# Patient Record
Sex: FEMALE | Race: WHITE | NOT HISPANIC OR LATINO | Employment: FULL TIME | URBAN - METROPOLITAN AREA
[De-identification: names, ages, dates, MRNs, and addresses within clinical notes are randomized per-mention and may not be internally consistent; named-entity substitution may affect disease eponyms.]

---

## 2017-02-06 ENCOUNTER — OFFICE VISIT (OUTPATIENT)
Dept: INTERNAL MEDICINE | Facility: CLINIC | Age: 57
End: 2017-02-06

## 2017-02-06 VITALS
RESPIRATION RATE: 20 BRPM | SYSTOLIC BLOOD PRESSURE: 140 MMHG | DIASTOLIC BLOOD PRESSURE: 82 MMHG | WEIGHT: 139 LBS | TEMPERATURE: 98.5 F | BODY MASS INDEX: 25.02 KG/M2 | HEART RATE: 72 BPM

## 2017-02-06 DIAGNOSIS — M54.6 ACUTE RIGHT-SIDED THORACIC BACK PAIN: Primary | ICD-10-CM

## 2017-02-06 PROCEDURE — 99214 OFFICE O/P EST MOD 30 MIN: CPT | Performed by: INTERNAL MEDICINE

## 2017-02-06 RX ORDER — CYCLOBENZAPRINE HCL 10 MG
10 TABLET ORAL 3 TIMES DAILY PRN
Qty: 30 TABLET | Refills: 0 | Status: SHIPPED | OUTPATIENT
Start: 2017-02-06 | End: 2017-02-16

## 2017-02-06 NOTE — PROGRESS NOTES
Subjective       Анна Milner is a 57 y.o. female.     Chief Complaint   Patient presents with   • Back Pain     mid back x 1 day      The patient states she twisted a certain way and felt a spasm in her back.  It got a little better, but worse again this morning.  No previous back problems.    Back Pain   This is a new problem. The current episode started yesterday. The problem occurs constantly. The problem has been gradually worsening since onset. Pain location: bilateral mid back muscles. The quality of the pain is described as aching and shooting. The pain does not radiate. The pain is moderate (severe with spasms). The symptoms are aggravated by twisting. Pertinent negatives include no abdominal pain, bladder incontinence, bowel incontinence, chest pain, dysuria, fever, leg pain, numbness, paresis, paresthesias, pelvic pain, tingling or weakness. Risk factors: None. She has tried NSAIDs and heat for the symptoms. The treatment provided mild relief.        The following portions of the patient's history were reviewed and updated as appropriate: allergies, current medications, past family history, past medical history, past social history, past surgical history and problem list.      Review of Systems   Constitutional: Negative for chills and fever.   Respiratory: Negative for cough and shortness of breath.    Cardiovascular: Negative for chest pain.   Gastrointestinal: Negative for abdominal pain, blood in stool, bowel incontinence, constipation, diarrhea, nausea and vomiting.        Denies melena.   Genitourinary: Positive for vaginal bleeding (on menses). Negative for bladder incontinence, decreased urine volume, dysuria, frequency, hematuria, pelvic pain and vaginal discharge.   Musculoskeletal: Positive for back pain.   Skin: Negative for rash.   Neurological: Negative for tingling, weakness, numbness and paresthesias.         Blood pressure 140/82, pulse 72, temperature 98.5 °F (36.9 °C), temperature  source Temporal Artery , resp. rate 20, weight 139 lb (63 kg), not currently breastfeeding.      Objective     Physical Exam   Constitutional: She appears well-developed and well-nourished.   Neck: Normal range of motion. Neck supple.   There is no tenderness to palpation of the cervical spine or paraspinal muscles.   Cardiovascular: Normal rate, regular rhythm and normal heart sounds.    No murmur heard.  Pulmonary/Chest: Effort normal and breath sounds normal.   Abdominal: Soft. Bowel sounds are normal. She exhibits no distension and no mass. There is no hepatomegaly. There is no tenderness.   No CVA tenderness.   Musculoskeletal: Normal range of motion.   There is no tenderness to palpation over the lumbar or  thoracic spine.  There is tenderness to palpation over the right thoracic paraspinal muscles. There is no pain to palpation over the lumbar or left thoracic paraspinal muscles.  Straight leg raise is positive bilaterally, causing pain on the right middle back.  There is pain with right hip abduction.  There is no pain with right hip flexion, extension,  and adduction. There is no pain with left hip flexion, extension, abduction, and adduction.  There is no pain with left hip flexion, extension, abduction, and adduction.   Neurological: She has normal strength. She displays abnormal reflex (3+ and equal throughout).   Skin: No rash noted.   Psychiatric: She has a normal mood and affect.   Nursing note and vitals reviewed.        Assessment/Plan   Анна was seen today for back pain.    Diagnoses and all orders for this visit:    Acute right-sided thoracic back pain  -     diclofenac (VOLTAREN) 50 MG EC tablet; Take 1 tablet by mouth 2 (Two) Times a Day As Needed (pain) for up to 14 days.  -     cyclobenzaprine (FLEXERIL) 10 MG tablet; Take 1 tablet by mouth 3 (Three) Times a Day As Needed for muscle spasms for up to 10 days.          Continue heat.    Return if symptoms worsen or fail to improve.

## 2017-02-06 NOTE — PATIENT INSTRUCTIONS
Continue heat.          Back Pain, Adult  Back pain is very common in adults. The cause of back pain is rarely dangerous and the pain often gets better over time. The cause of your back pain may not be known. Some common causes of back pain include:  · Strain of the muscles or ligaments supporting the spine.  · Wear and tear (degeneration) of the spinal disks.  · Arthritis.  · Direct injury to the back.  For many people, back pain may return. Since back pain is rarely dangerous, most people can learn to manage this condition on their own.  HOME CARE INSTRUCTIONS  Watch your back pain for any changes. The following actions may help to lessen any discomfort you are feeling:  · Remain active. It is stressful on your back to sit or  one place for long periods of time. Do not sit, drive, or  one place for more than 30 minutes at a time. Take short walks on even surfaces as soon as you are able. Try to increase the length of time you walk each day.  · Exercise regularly as directed by your health care provider. Exercise helps your back heal faster. It also helps avoid future injury by keeping your muscles strong and flexible.  · Do not stay in bed. Resting more than 1-2 days can delay your recovery.  · Pay attention to your body when you bend and lift. The most comfortable positions are those that put less stress on your recovering back. Always use proper lifting techniques, including:    Bending your knees.    Keeping the load close to your body.    Avoiding twisting.  · Find a comfortable position to sleep. Use a firm mattress and lie on your side with your knees slightly bent. If you lie on your back, put a pillow under your knees.  · Avoid feeling anxious or stressed. Stress increases muscle tension and can worsen back pain. It is important to recognize when you are anxious or stressed and learn ways to manage it, such as with exercise.  · Take medicines only as directed by your health care provider.  Over-the-counter medicines to reduce pain and inflammation are often the most helpful. Your health care provider may prescribe muscle relaxant drugs. These medicines help dull your pain so you can more quickly return to your normal activities and healthy exercise.  · Apply ice to the injured area:    Put ice in a plastic bag.    Place a towel between your skin and the bag.    Leave the ice on for 20 minutes, 2-3 times a day for the first 2-3 days. After that, ice and heat may be alternated to reduce pain and spasms.  · Maintain a healthy weight. Excess weight puts extra stress on your back and makes it difficult to maintain good posture.  SEEK MEDICAL CARE IF:  · You have pain that is not relieved with rest or medicine.  · You have increasing pain going down into the legs or buttocks.  · You have pain that does not improve in one week.  · You have night pain.  · You lose weight.  · You have a fever or chills.  SEEK IMMEDIATE MEDICAL CARE IF:   · You develop new bowel or bladder control problems.  · You have unusual weakness or numbness in your arms or legs.  · You develop nausea or vomiting.  · You develop abdominal pain.  · You feel faint.     This information is not intended to replace advice given to you by your health care provider. Make sure you discuss any questions you have with your health care provider.     Document Released: 12/18/2006 Document Revised: 01/08/2016 Document Reviewed: 04/21/2015  [employer] Interactive Patient Education ©2016 [employer]

## 2017-04-13 ENCOUNTER — OFFICE VISIT (OUTPATIENT)
Dept: INTERNAL MEDICINE | Facility: CLINIC | Age: 57
End: 2017-04-13

## 2017-04-13 ENCOUNTER — HOSPITAL ENCOUNTER (OUTPATIENT)
Dept: GENERAL RADIOLOGY | Facility: HOSPITAL | Age: 57
Discharge: HOME | End: 2017-04-13
Attending: INTERNAL MEDICINE | Admitting: INTERNAL MEDICINE

## 2017-04-13 VITALS
WEIGHT: 134 LBS | BODY MASS INDEX: 24.12 KG/M2 | SYSTOLIC BLOOD PRESSURE: 118 MMHG | HEART RATE: 76 BPM | TEMPERATURE: 98.5 F | RESPIRATION RATE: 16 BRPM | DIASTOLIC BLOOD PRESSURE: 86 MMHG

## 2017-04-13 DIAGNOSIS — S92.354A CLOSED NONDISPLACED FRACTURE OF FIFTH METATARSAL BONE OF RIGHT FOOT, INITIAL ENCOUNTER: ICD-10-CM

## 2017-04-13 DIAGNOSIS — M79.671 RIGHT FOOT PAIN: ICD-10-CM

## 2017-04-13 DIAGNOSIS — M79.671 RIGHT FOOT PAIN: Primary | ICD-10-CM

## 2017-04-13 PROCEDURE — 73630 X-RAY EXAM OF FOOT: CPT

## 2017-04-13 PROCEDURE — 99214 OFFICE O/P EST MOD 30 MIN: CPT | Performed by: INTERNAL MEDICINE

## 2017-04-13 RX ORDER — HYDROCODONE BITARTRATE AND ACETAMINOPHEN 7.5; 325 MG/1; MG/1
1 TABLET ORAL EVERY 6 HOURS PRN
Qty: 12 TABLET | Refills: 0 | Status: SHIPPED | OUTPATIENT
Start: 2017-04-13 | End: 2017-11-01

## 2017-04-13 NOTE — PROGRESS NOTES
Chief Complaint   Patient presents with   • Fall   • Foot Injury     Right       History of Present Illness      The patient presents with pain in the right foot of a one day duration. There is a history of trauma. The trauma is described as a twisting injury. She fell down some steps. The trauma occurred yesterday.       The patient has swelling associated with the pain. There is no stiffness. The patient denies a history of overuse or repetitive motion with the affected joints.    The joint pain is aggravated by motion, walking, touching the affected area and manipulation of the affected area. The pain has no alleviating factors noted.     Medications      Current Outpatient Prescriptions:   •  diphenhydrAMINE (BENADRYL) 25 mg, Take 1 capsule by mouth At Night As Needed., Disp: , Rfl:      Allergies    Allergies   Allergen Reactions   • Sulfa Antibiotics Anaphylaxis   • Banana GI Intolerance   • Citrus GI Intolerance   • Dairy Aid [Lactase]    • Eggs Or Egg-Derived Products Diarrhea   • Pineapple GI Intolerance   • Wheat Bran    • Cephalosporins Rash       Problem List    Patient Active Problem List   Diagnosis   • Anxiety   • Oral thrush       Medications, Allergies, Problems List and Past History were reviewed and updated.    Physical Examination    /86 (BP Location: Left arm, Patient Position: Sitting)  Pulse 76  Temp 98.5 °F (36.9 °C) (Temporal Artery )   Resp 16  Wt 134 lb (60.8 kg)  BMI 24.12 kg/m2    Feet: The feet are symmetric with normal erickson landmarks. There is pain overlying the lateral aspect of the right foot. The feet have normal posterior tibial and dorsalis pedis pulses and normal capillary refill bilaterally. The arches are normal bilaterally. There are no skin or nail lesions present. There are no ingrown nails.    Radiology    My personal interpretation of the x-rays is as stated below:    The X-Ray of the right foot reveals a fracture. A fracture is seen in the DIP joint and  metatarsal of the fifth digit of the right foot. The fracture is non-displaced and obliqued. The bone fragment seen is not within the joint space.    Impression and Assessment    Fracture of the DIP Joint and Fracture of the Metatarsal of the fifth digit of the right foot ; the fracture is non-displaced and is obliqued.    Foot Pain.    Plan    Foot Pain Plan: Medication will be added as noted.    Fracture of the DIP Joint and Fracture of the Metatarsal Plan: She was referred to orthopedics.    Анна was seen today for fall and foot injury.    Diagnoses and all orders for this visit:    Right foot pain  -     XR Foot 3+ View Right; Future  -     Ambulatory Referral to Orthopedic Surgery  -     HYDROcodone-acetaminophen (NORCO) 7.5-325 MG per tablet; Take 1 tablet by mouth Every 6 (Six) Hours As Needed for Moderate Pain (4-6).    Closed nondisplaced fracture of fifth metatarsal bone of right foot, initial encounter  -     Ambulatory Referral to Orthopedic Surgery  -     HYDROcodone-acetaminophen (NORCO) 7.5-325 MG per tablet; Take 1 tablet by mouth Every 6 (Six) Hours As Needed for Moderate Pain (4-6).        Return to Office    The patient was instructed to return for follow-up at the next scheduled visit.    The patient was instructed to return sooner if the condition changes, worsens, or doesn't resolve.

## 2017-11-01 ENCOUNTER — OFFICE VISIT (OUTPATIENT)
Dept: INTERNAL MEDICINE | Facility: CLINIC | Age: 57
End: 2017-11-01

## 2017-11-01 VITALS
SYSTOLIC BLOOD PRESSURE: 118 MMHG | HEIGHT: 62 IN | DIASTOLIC BLOOD PRESSURE: 80 MMHG | RESPIRATION RATE: 14 BRPM | WEIGHT: 147 LBS | TEMPERATURE: 98.4 F | HEART RATE: 72 BPM | BODY MASS INDEX: 27.05 KG/M2

## 2017-11-01 DIAGNOSIS — Z00.00 PHYSICAL EXAM: Primary | ICD-10-CM

## 2017-11-01 DIAGNOSIS — Z23 IMMUNIZATION DUE: ICD-10-CM

## 2017-11-01 LAB
ARTICHOKE IGE QN: 126 MG/DL (ref 0–130)
BILIRUB BLD-MCNC: NEGATIVE MG/DL
CHOLEST SERPL-MCNC: 206 MG/DL (ref 0–200)
CLARITY, POC: CLEAR
COLOR UR: YELLOW
EXPIRATION DATE: NORMAL
GLUCOSE UR STRIP-MCNC: NEGATIVE MG/DL
HDLC SERPL-MCNC: 84 MG/DL (ref 40–60)
KETONES UR QL: NEGATIVE
LEUKOCYTE EST, POC: NEGATIVE
Lab: NORMAL
NITRITE UR-MCNC: NEGATIVE MG/ML
PH UR: 6 [PH] (ref 5–8)
PROT UR STRIP-MCNC: NEGATIVE MG/DL
RBC # UR STRIP: NEGATIVE /UL
SP GR UR: 1.01 (ref 1–1.03)
TRIGL SERPL-MCNC: 119 MG/DL (ref 0–150)
UROBILINOGEN UR QL: NORMAL

## 2017-11-01 PROCEDURE — 81003 URINALYSIS AUTO W/O SCOPE: CPT | Performed by: INTERNAL MEDICINE

## 2017-11-01 PROCEDURE — 99396 PREV VISIT EST AGE 40-64: CPT | Performed by: INTERNAL MEDICINE

## 2017-11-01 PROCEDURE — 36415 COLL VENOUS BLD VENIPUNCTURE: CPT | Performed by: INTERNAL MEDICINE

## 2017-11-01 PROCEDURE — 80061 LIPID PANEL: CPT | Performed by: INTERNAL MEDICINE

## 2017-11-01 PROCEDURE — 90471 IMMUNIZATION ADMIN: CPT | Performed by: INTERNAL MEDICINE

## 2017-11-01 PROCEDURE — 90715 TDAP VACCINE 7 YRS/> IM: CPT | Performed by: INTERNAL MEDICINE

## 2017-11-01 NOTE — PROGRESS NOTES
Chief Complaint   Patient presents with   • Annual Exam       History of Present Illness      The patient presents for an established patient physical examination and health maintenance visit. The patient has had a colonoscopy.    She received a mammogram last month at .  She goes to  for gynecologic examinations.    Review of Systems    GENERAL- Denies Unexplained Weight Loss, Fever, Chills, Sweats, Fatigue, Weakness or Malaise.    HEENT- Denies Eye Pain, Eye Drainage, Nasal Discharge, Sore Throat, Ear Pain, Ear Drainage, Headache, Alopecia, Decreased Vision, Sinus Pain, Nasal Congestion, Decreased Hearing, Visual Disturbance, Diplopia or Tinnitus.    NECK- Denies Decreased Range of Motion, Stiffness, Thyroid Nodules, Enlarged Lymph Nodes or Goiter.    CARDIOVASCULAR- Denies Chest Pain, Claudication, Edema, Syncope or Palpitations.    PULMONARY- Denies Wheezing, Dyspnea, Sputum Production, Cough, Hemoptysis or Pleuritic Chest Pain.    GASTROINTESTINAL- Denies Abdominal Pain, Nausea, Vomiting, Diarrhea, Blood per Rectum, Constipation or Heartburn.    GENITOURINARY- Denies Dysuria, Hematuria, Urinary Frequency, Urinary Leakage, Pelvic Pain, Vaginal Prolapse, Vaginal Discharge, Dyspareunia or Abnormal Menses.    ENDOCRINE- Denies Cold Intolerance, Depression, Heat Intolerance, Memory Loss, Polydypsia, Polyphagia, Polyuria, Sleep Disturbance or Weight Gain.    NEUROLOGIC- Denies Seizures, Visual Changes, Confusion or Excessive Daytime Sleepiness.    MUSCULOSKELETAL- Denies Joint Pain, Joint Stiffness, Decreased Range of Motion, Joint Swelling or Erythema of Joints.    INTEGUMENTARY- Denies Rash, Lumps, Sores, Itching, Dryness, Color Change, Changes in Hair, Brittle Nails, Discolored Nails, Thickened Nails or Growths.    Medications      Current Outpatient Prescriptions:   •  diphenhydrAMINE (BENADRYL) 25 mg, Take 1 capsule by mouth At Night As Needed., Disp: , Rfl:      Allergies    Allergies   Allergen Reactions  "  • Sulfa Antibiotics Anaphylaxis   • Banana GI Intolerance   • Blueberry [Vaccinium Angustifolium] Other (See Comments)     PER ALLERGY TESTING   • Citrus GI Intolerance   • Dairy Aid [Lactase]    • Eggs Or Egg-Derived Products Diarrhea   • Pineapple GI Intolerance   • Wheat Bran    • Cephalosporins Rash       Problem List    Patient Active Problem List   Diagnosis   • Anxiety   • Oral thrush       Medications, Allergies, Problems List and Past History were reviewed and updated.    Physical Examination    /80 (BP Location: Left arm, Patient Position: Sitting, Cuff Size: Adult)  Pulse 72  Temp 98.4 °F (36.9 °C) (Temporal Artery )   Resp 14  Ht 62.25\" (158.1 cm)  Wt 147 lb (66.7 kg)  LMP 03/06/2017 (Approximate) Comment: LAST PAP 11/2016 DR AMAYA  Breastfeeding? No  BMI 26.67 kg/m2    HEENT: Head- Normocephalic Atraumatic. Facies- Within normal limits. Pinnas- Normal texture and shape bilaterally. Canals- Normal bilaterally. TMs- Normal bilaterally. Nares- Patent bilaterally. Nasal Septum- is normal. There is no tenderness to palpation over the frontal or maxillary sinuses. Lids- Normal bilaterally. Conjunctiva- Clear bilaterally. Sclera- Anicteric bilaterally. Oropharynx- Moist with no lesions. Tonsils- No enlargement, erythema or exudate.    Neck: Thyroid- non enlarged, symmetric and has no nodules. No bruits are detected. ROM- Normal Range of Motion with no rigidity.    Lymph Nodes: Cervical- no enlarged lymph nodes noted. Clavicular- Deferred. Axillary- Deferred. Inguinal- Deferred.    Lungs: Auscultation- Clear to auscultation bilaterally. There are no retractions, clubbing or cyanosis. The Expiratory to Inspiratory ratio is equal.    Cardiovascular: There are no carotid bruits. Heart- Normal Rate with Regular rhythm and no murmurs. There are no gallops. There are no rubs. In the lower extremities there is no edema. The upper extremities do not have edema.    Abdomen: Soft, benign, non-tender with " no masses, hernias, organomegaly or scars.    Breast: Normal contours bilaterally with no masses, discharge, skin changes or lumps. There are no scars noted.    The patient has no worrisome or suspicious skin lesions noted.    Impression and Assessment    Normal Physical Examination.    Plan    Counseling was provided regarding: Adequate Aerobic Exercise, Dental Visits, Flossing Teeth, Handwashing, Heart Healthy Diet and Seat Belt Utilization.    The following was ordered for screening and health maintenance: Lipid Profile and U/A.    Counseled regarding immunizations and applicable VIS given.    Immunizations Ordered and Administered: Tdap.    Анна was seen today for annual exam.    Diagnoses and all orders for this visit:    Physical exam  -     POC Urinalysis Dipstick, Automated  -     Lipid Panel  -     Tdap Vaccine Greater Than or Equal To 8yo IM    Immunization due  -     Tdap Vaccine Greater Than or Equal To 8yo IM        Return to Office    The patient was instructed to return for follow-up in 1 year. Next Visit: Physical.    The patient was instructed to return sooner if the condition changes, worsens, or doesn't resolve.

## 2018-07-02 ENCOUNTER — OFFICE VISIT (OUTPATIENT)
Dept: INTERNAL MEDICINE | Facility: CLINIC | Age: 58
End: 2018-07-02

## 2018-07-02 VITALS
TEMPERATURE: 98.2 F | SYSTOLIC BLOOD PRESSURE: 118 MMHG | BODY MASS INDEX: 25.98 KG/M2 | HEART RATE: 92 BPM | WEIGHT: 143.2 LBS | RESPIRATION RATE: 16 BRPM | DIASTOLIC BLOOD PRESSURE: 70 MMHG

## 2018-07-02 DIAGNOSIS — M25.512 ACUTE PAIN OF LEFT SHOULDER: ICD-10-CM

## 2018-07-02 DIAGNOSIS — M54.2 NECK PAIN: Primary | ICD-10-CM

## 2018-07-02 PROCEDURE — 99214 OFFICE O/P EST MOD 30 MIN: CPT | Performed by: NURSE PRACTITIONER

## 2018-07-02 RX ORDER — TIZANIDINE 2 MG/1
2 TABLET ORAL NIGHTLY PRN
Qty: 30 TABLET | Refills: 0 | Status: SHIPPED | OUTPATIENT
Start: 2018-07-02 | End: 2018-08-13
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
## 2018-07-03 ENCOUNTER — HOSPITAL ENCOUNTER (OUTPATIENT)
Dept: GENERAL RADIOLOGY | Facility: HOSPITAL | Age: 58
Discharge: HOME | End: 2018-07-03
Admitting: NURSE PRACTITIONER

## 2018-07-03 DIAGNOSIS — M54.2 NECK PAIN: ICD-10-CM

## 2018-07-03 PROCEDURE — 73030 X-RAY EXAM OF SHOULDER: CPT

## 2018-07-03 PROCEDURE — 72040 X-RAY EXAM NECK SPINE 2-3 VW: CPT

## 2018-07-09 ENCOUNTER — TELEPHONE (OUTPATIENT)
Dept: INTERNAL MEDICINE | Facility: CLINIC | Age: 58
End: 2018-07-09

## 2018-07-09 NOTE — TELEPHONE ENCOUNTER
----- Message from Catalina Schuster sent at 7/9/2018 11:32 AM EDT -----  Contact: SELF  REEMA SRIVASTAVA WAS SENT UP FOR A 1 MONTH APPT ON 8/7/18 WITH JAHAIRA BUT SAID IT WAS SUPPOSED TO BE WITH DR YOUNG. SHE WANTS TO KNOW IF YOU COULD FIT HER IN AROUND THAT SAME DAY. REEMA CAN BE REACHED -[phone]

## 2018-07-10 NOTE — TELEPHONE ENCOUNTER
It will need to be in a regularly scheduled slot as I can't stay that late.  Sorry.  Oseas Mendenhall MD  1:32 PM  07/10/18

## 2018-07-19 ENCOUNTER — HOSPITAL ENCOUNTER (OUTPATIENT)
Dept: PHYSICAL THERAPY | Facility: HOSPITAL | Age: 58
Setting detail: THERAPIES SERIES
Discharge: HOME | End: 2018-07-19

## 2018-07-19 DIAGNOSIS — M54.2 NECK PAIN: Primary | ICD-10-CM

## 2018-07-19 PROCEDURE — 97161 PT EVAL LOW COMPLEX 20 MIN: CPT | Performed by: PHYSICAL THERAPIST
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
## 2018-07-24 ENCOUNTER — HOSPITAL ENCOUNTER (OUTPATIENT)
Dept: PHYSICAL THERAPY | Facility: HOSPITAL | Age: 58
Setting detail: THERAPIES SERIES
Discharge: HOME | End: 2018-07-24

## 2018-07-24 DIAGNOSIS — M54.2 NECK PAIN: Primary | ICD-10-CM

## 2018-07-24 PROCEDURE — 97110 THERAPEUTIC EXERCISES: CPT | Performed by: PHYSICAL THERAPIST

## 2018-07-24 PROCEDURE — 97140 MANUAL THERAPY 1/> REGIONS: CPT | Performed by: PHYSICAL THERAPIST

## 2018-07-24 NOTE — THERAPY TREATMENT NOTE
Outpatient Physical Therapy Ortho Treatment Note  [facility]     Patient Name: Анна Turpin  : 1960  MRN: [id number]  Today's Date: 2018      Visit Date: 2018    Visit Dx:    ICD-10-CM ICD-9-CM   1. Neck pain M54.2 723.1       Patient Active Problem List   Diagnosis   • Anxiety   • Oral thrush        Past Medical History:   Diagnosis Date   • Allergic    • Asthma         Past Surgical History:   Procedure Laterality Date   • EYE SURGERY     • TONSILLECTOMY                               PT Assessment/Plan     Row Name 18 1000          PT Assessment    Assessment Comments Patient tolerates treatment well and she is demonstrating spontaneous improvement in her overall reactivity.  -PIERCE        PT Plan    PT Plan Comments add scapular exercises: mid/low rows and no money.  -PIERCE       User Key  (r) = Recorded By, (t) = Taken By, (c) = Cosigned By    Initials Name Provider Type    PIERCE Madrid, PT Physical Therapist                    Exercises     Row Name 18 0900 18 0800          Subjective Comments    Subjective Comments  -- Patient states that she has began to feel better, although she hasn't been very compliant with her exercises.  -PIERCE        Subjective Pain    Able to rate subjective pain?  -- yes  -PIERCE     Pre-Treatment Pain Level  -- 2  -PIERCE     Post-Treatment Pain Level  -- 2  -PIERCE        Total Minutes    31803 - PT Therapeutic Exercise Minutes  -- 17  -PIERCE     07422 - PT Manual Therapy Minutes 19  -PIERCE  --        Exercise 2    Exercise Name 2  -- supine chin tuck  -PIERCE     Sets 2  -- 2  -PIERCE     Reps 2  -- 10  -PIERCE     Time 2  -- 3 seconds  -PIERCE        Exercise 3    Exercise Name 3  -- seated upper trap stretch  -PIERCE     Sets 3  -- 3  -PIERCE     Time 3  -- 30 seconds  -PIERCE        Exercise 4    Exercise Name 4  -- scap squeezes  -PIERCE     Reps 4  -- 15  -PIERCE        Exercise 5    Exercise Name 5  -- door stretch arms wide  -PIERCE     Sets 5  -- 3  -PIERCE     Time 5  -- 30 seconds  -PIERCE        User Key  (r) = Recorded By, (t) = Taken By, (c) = Cosigned By    Initials Name Provider Type    PIERCE Madrid PT Physical Therapist                        Manual Rx (last 36 hours)      Manual Treatments     Row Name 07/24/18 0900             Total Minutes    02618 - PT Manual Therapy Minutes 19  -PIERCE         Manual Rx 1    Manual Rx 1 Location cervical spine  -PIERCE      Manual Rx 1 Type suboccipital distraction, unilateral PAs, SGL's, manual trap stretching  -PIERCE        User Key  (r) = Recorded By, (t) = Taken By, (c) = Cosigned By    Initials Name Provider Type    PIERCE Madrid PT Physical Therapist              Therapy Education  Education Details: added upper trap stretch, supine chin tuck, and door stretch arms wide  Given: HEP  Program: New  How Provided: Verbal, Demonstration, Written  Provided to: Patient  Level of Understanding: Verbalized, Demonstrated              Time Calculation:   Start Time: 0734  Therapy Suggested Charges     Code   Minutes Charges    57903 (CPT®) Hc Pt Neuromusc Re Education Ea 15 Min      77615 (CPT®) Hc Pt Ther Proc Ea 15 Min 17 1    29800 (CPT®) Hc Gait Training Ea 15 Min      90496 (CPT®) Hc Pt Therapeutic Act Ea 15 Min      39491 (CPT®) Hc Pt Manual Therapy Ea 15 Min 19 1    24057 (CPT®) Hc Pt Ther Massage- Per 15 Min      03960 (CPT®) Hc Pt Iontophoresis Ea 15 Min      28556 (CPT®) Hc Pt Elec Stim Ea-Per 15 Min      92745 (CPT®) Hc Pt Ultrasound Ea 15 Min      42232 (CPT®) Hc Pt Self Care/Mgmt/Train Ea 15 Min      Total  36 2        Therapy Charges for Today     Code Description Service Date Service Provider Modifiers Qty    51713095990 HC PT THER PROC EA 15 MIN 7/24/2018 Michael Madrid, PT GP 1    55718956588 HC PT MANUAL THERAPY EA 15 MIN 7/24/2018 Michael Madrid, PT GP 1                    Michael Madrid, PT  7/24/2018

## 2018-07-26 ENCOUNTER — HOSPITAL ENCOUNTER (OUTPATIENT)
Dept: PHYSICAL THERAPY | Facility: HOSPITAL | Age: 58
Setting detail: THERAPIES SERIES
Discharge: HOME | End: 2018-07-26

## 2018-07-26 DIAGNOSIS — M54.2 NECK PAIN: Primary | ICD-10-CM

## 2018-07-26 PROCEDURE — 97140 MANUAL THERAPY 1/> REGIONS: CPT | Performed by: PHYSICAL THERAPIST

## 2018-07-26 PROCEDURE — 97110 THERAPEUTIC EXERCISES: CPT | Performed by: PHYSICAL THERAPIST

## 2018-07-26 NOTE — THERAPY TREATMENT NOTE
"    Outpatient Physical Therapy Ortho Treatment Note  [facility]     Patient Name: Анна Turpin  : 1960  MRN: [id number]  Today's Date: 2018      Visit Date: 2018    Visit Dx:    ICD-10-CM ICD-9-CM   1. Neck pain M54.2 723.1       Patient Active Problem List   Diagnosis   • Anxiety   • Oral thrush        Past Medical History:   Diagnosis Date   • Allergic    • Asthma         Past Surgical History:   Procedure Laterality Date   • EYE SURGERY     • TONSILLECTOMY                               PT Assessment/Plan     Row Name 18 1100          PT Assessment    Assessment Comments Patient reports continued tension in the upper trap and scapular region.  She tolerates treatment well overall.  DN was discussed but patient elected to try other treatment methods.  -PIERCE        PT Plan    PT Plan Comments add thoracic mobility  -PIERCE       User Key  (r) = Recorded By, (t) = Taken By, (c) = Cosigned By    Initials Name Provider Type    PIERCE Madrid, PT Physical Therapist                    Exercises     Row Name 18 0800             Subjective Comments    Subjective Comments Patient states that she remains \"tight\" in regards to her right scapular region and upper trap region.  She reports that she is moving this weekend and plans to do a lot of lifting.  She has been compliant with her HEP.  -PIERCE         Subjective Pain    Able to rate subjective pain? yes  -PIERCE      Pre-Treatment Pain Level 2  -PIERCE         Total Minutes    38376 - PT Therapeutic Exercise Minutes 24  -PIERCE      63602 - PT Manual Therapy Minutes 15  -PIERCE         Exercise 2    Exercise Name 2 supine chin tuck  -PIERCE      Sets 2 2  -PIERCE      Reps 2 10  -PIERCE      Time 2 3 seconds  -PIERCE         Exercise 3    Exercise Name 3 seated upper trap/levator stretch  -PIERCE      Sets 3 3  -PIERCE      Time 3 30 seconds  -PIERCE         Exercise 4    Exercise Name 4 scap squeezes  -PIERCE      Reps 4 15  -PIERCE         Exercise 5    Exercise Name 5 door stretch arms wide  " -PIERCE      Sets 5 3  -PIERCE      Time 5 30 seconds  -PIERCE         Exercise 6    Exercise Name 6 levator stretch  -PIERCE      Sets 6 3  -PIERCE      Time 6 30 seconds  -PIERCE         Exercise 7    Exercise Name 7 middle rows green band  -PIERCE      Reps 7 15  -PIERCE         Exercise 8    Exercise Name 8 low rows green band  -PIERCE      Reps 8 15  -PIERCE         Exercise 9    Exercise Name 9 no money green band  -PIERCE      Reps 9 15  -PIERCE         Exercise 10    Exercise Name 10 elephant stretch  -PIERCE      Sets 10 2  -PIERCE      Time 10 30 seconds  -PIERCE        User Key  (r) = Recorded By, (t) = Taken By, (c) = Cosigned By    Initials Name Provider Type    PIERCE Madrid, PT Physical Therapist                        Manual Rx (last 36 hours)      Manual Treatments     Row Name 07/26/18 0800             Total Minutes    71156 - PT Manual Therapy Minutes 15  -PIERCE         Manual Rx 1    Manual Rx 1 Location cervical spine  -PIERCE      Manual Rx 1 Type suboccipital distraction, unilateral PAs, SGL's, manual trap stretching  -PIERCE         Manual Rx 2    Manual Rx 2 Location scapula  -PIERCE      Manual Rx 2 Type sidelying winging mobilization  -PIERCE        User Key  (r) = Recorded By, (t) = Taken By, (c) = Cosigned By    Initials Name Provider Type    PIERCE Madrid, PT Physical Therapist              Therapy Education  Education Details: added band exercises and levator stretch, held scap squeezes  Given: HEP  Program: New  How Provided: Verbal, Demonstration, Written  Provided to: Patient  Level of Understanding: Verbalized, Demonstrated              Time Calculation:   Start Time: 0730  Therapy Suggested Charges     Code   Minutes Charges    61657 (CPT®) Hc Pt Neuromusc Re Education Ea 15 Min      67142 (CPT®) Hc Pt Ther Proc Ea 15 Min 24 2    50186 (CPT®) Hc Gait Training Ea 15 Min      79686 (CPT®) Hc Pt Therapeutic Act Ea 15 Min      02087 (CPT®) Hc Pt Manual Therapy Ea 15 Min 15 1    25426 (CPT®) Hc Pt Ther Massage- Per 15 Min      11946 (CPT®) Hc Pt  Iontophoresis Ea 15 Min      44385 (CPT®) Hc Pt Elec Stim Ea-Per 15 Min      34383 (CPT®) Hc Pt Ultrasound Ea 15 Min      33822 (CPT®) Hc Pt Self Care/Mgmt/Train Ea 15 Min      Total  39 3        Therapy Charges for Today     Code Description Service Date Service Provider Modifiers Qty    47882336079 HC PT THER PROC EA 15 MIN 7/26/2018 Michael Madrid, PT GP 2    05695687723 HC PT MANUAL THERAPY EA 15 MIN 7/26/2018 Michael Madrid, PT GP 1                    Michael Madrid, PT  7/26/2018

## 2018-07-31 ENCOUNTER — HOSPITAL ENCOUNTER (OUTPATIENT)
Dept: PHYSICAL THERAPY | Facility: HOSPITAL | Age: 58
Setting detail: THERAPIES SERIES
Discharge: HOME | End: 2018-07-31

## 2018-07-31 DIAGNOSIS — M54.2 NECK PAIN: Primary | ICD-10-CM

## 2018-07-31 PROCEDURE — 97035 APP MDLTY 1+ULTRASOUND EA 15: CPT | Performed by: PHYSICAL THERAPIST

## 2018-07-31 PROCEDURE — 97110 THERAPEUTIC EXERCISES: CPT | Performed by: PHYSICAL THERAPIST

## 2018-08-02 ENCOUNTER — HOSPITAL ENCOUNTER (OUTPATIENT)
Dept: PHYSICAL THERAPY | Facility: HOSPITAL | Age: 58
Setting detail: THERAPIES SERIES
Discharge: HOME | End: 2018-08-02

## 2018-08-02 DIAGNOSIS — M54.2 NECK PAIN: Primary | ICD-10-CM

## 2018-08-02 PROCEDURE — 97110 THERAPEUTIC EXERCISES: CPT | Performed by: PHYSICAL THERAPIST

## 2018-08-02 PROCEDURE — 97035 APP MDLTY 1+ULTRASOUND EA 15: CPT | Performed by: PHYSICAL THERAPIST

## 2018-08-02 NOTE — THERAPY TREATMENT NOTE
Outpatient Physical Therapy Ortho Treatment Note  [facility]     Patient Name: Анна Turpin  : 1960  MRN: [id number]  Today's Date: 2018      Visit Date: 2018    Visit Dx:    ICD-10-CM ICD-9-CM   1. Neck pain M54.2 723.1       Patient Active Problem List   Diagnosis   • Anxiety   • Oral thrush        Past Medical History:   Diagnosis Date   • Allergic    • Asthma         Past Surgical History:   Procedure Laterality Date   • EYE SURGERY     • TONSILLECTOMY                               PT Assessment/Plan     Row Name 18 0800          PT Assessment    Assessment Comments Pt verbalized compliance with HEP, decrease pain after manual techniques along scapular border.   -CP        PT Plan    PT Plan Comments Assess response from manual, progress as tolerated in clinic. Could increase resistance next visit.   -CP       User Key  (r) = Recorded By, (t) = Taken By, (c) = Cosigned By    Initials Name Provider Type    CP Perkins, Corinne E, PT Physical Therapist                Modalities     Row Name 18 08             Ultrasound 33641    Location right medial scapular border, UT stretch  -CP      Duty Cycle 100  -CP      Frequency 1.0 MHz  -CP      Intensity - Wts/cm 1.2  -CP      93176 - PT Ultrasound Minutes 8  -CP        User Key  (r) = Recorded By, (t) = Taken By, (c) = Cosigned By    Initials Name Provider Type    CP Perkins, Corinne E, PT Physical Therapist                Exercises     Row Name 18 0800             Subjective Comments    Subjective Comments Pt states her neck pain has been better seen last visit. She reports mild headache yesterday, but decreased overall.   -CP         Subjective Pain    Able to rate subjective pain? yes  -CP      Pre-Treatment Pain Level 1  -CP      Post-Treatment Pain Level 0  -CP         Exercise 1    Exercise Name 1 UBE SciFit Level 1.5  -CP      Time 1 2 min forward, 2 min backward; 4 min total  -CP         Exercise 2    Exercise Name 2  Quadruped chin tucks  -CP      Reps 2 12x  -CP         Exercise 3    Exercise Name 3 seated upper trap/levator stretch  -CP      Sets 3 3  -CP      Time 3 30 seconds  -CP         Exercise 4    Exercise Name 4 quadruped cervical rotation with thoracic rotation  -CP      Reps 4 8x each  -CP         Exercise 5    Exercise Name 5 door stretch arms wide  -CP      Sets 5 3  -CP      Time 5 30 seconds  -CP         Exercise 6    Exercise Name 6 levator stretch  -CP      Sets 6 3  -CP      Time 6 30 seconds  -CP         Exercise 7    Exercise Name 7 middle rows BTB  -CP      Reps 7 15  -CP      Additional Comments GTB  -CP         Exercise 8    Exercise Name 8 low rows  -CP      Reps 8 15  -CP      Additional Comments GTB  -CP         Exercise 9    Exercise Name 9 sternal lifts  -CP      Reps 9 15  -CP      Additional Comments BTB  -CP         Exercise 10    Exercise Name 10 alternating UE reach quadruped position  -CP      Reps 10 5x each  -CP         Exercise 11    Exercise Name 11 Quadruped cat camel  -CP      Reps 11 8x  -CP         Exercise 12    Exercise Name 12 wall thoracic slides  -CP      Reps 12 10  -CP        User Key  (r) = Recorded By, (t) = Taken By, (c) = Cosigned By    Initials Name Provider Type    CP Perkins, Corinne E, PT Physical Therapist                        Manual Rx (last 36 hours)      Manual Treatments     Row Name 08/02/18 0800             Manual Rx 2    Manual Rx 2 Location scapula  -CP      Manual Rx 2 Type sidelying winging mobilization; STM along medial scapular border  -CP      Manual Rx 2 Duration 8  -CP        User Key  (r) = Recorded By, (t) = Taken By, (c) = Cosigned By    Initials Name Provider Type    CP Perkins, Corinne E, PT Physical Therapist                             Time Calculation:   Start Time: 0802  Total Timed Code Minutes- PT: 30 minute(s)  Therapy Suggested Charges     Code   Minutes Charges    18645 (CPT®) Hc Pt Neuromusc Re Education Ea 15 Min      17651 (CPT®) Hc Pt  Ther Proc Ea 15 Min      80597 (CPT®) Hc Gait Training Ea 15 Min      06670 (CPT®) Hc Pt Therapeutic Act Ea 15 Min      64164 (CPT®) Hc Pt Manual Therapy Ea 15 Min      63998 (CPT®) Hc Pt Ther Massage- Per 15 Min      96278 (CPT®) Hc Pt Iontophoresis Ea 15 Min      69290 (CPT®) Hc Pt Elec Stim Ea-Per 15 Min      94162 (CPT®) Hc Pt Ultrasound Ea 15 Min 8 1    20341 (CPT®) Hc Pt Self Care/Mgmt/Train Ea 15 Min      Total  8 1        Therapy Charges for Today     Code Description Service Date Service Provider Modifiers Qty    47762912496 HC PT ULTRASOUND EA 15 MIN 8/2/2018 Perkins, Corinne E, PT GP 1    50676155741 HC PT THER PROC EA 15 MIN 8/2/2018 Perkins, Corinne E, PT GP 2                    Corinne E. Perkins, PT  8/2/2018

## 2018-08-13 ENCOUNTER — OFFICE VISIT (OUTPATIENT)
Dept: INTERNAL MEDICINE | Facility: CLINIC | Age: 58
End: 2018-08-13

## 2018-08-13 VITALS
HEART RATE: 76 BPM | BODY MASS INDEX: 26.31 KG/M2 | TEMPERATURE: 98.1 F | DIASTOLIC BLOOD PRESSURE: 72 MMHG | RESPIRATION RATE: 16 BRPM | SYSTOLIC BLOOD PRESSURE: 114 MMHG | WEIGHT: 145 LBS

## 2018-08-13 DIAGNOSIS — S13.4XXD: Primary | ICD-10-CM

## 2018-08-13 DIAGNOSIS — S13.4XXD: ICD-10-CM

## 2018-08-13 PROCEDURE — 99214 OFFICE O/P EST MOD 30 MIN: CPT | Performed by: INTERNAL MEDICINE

## 2018-08-13 RX ORDER — CYCLOBENZAPRINE HCL 10 MG
10 TABLET ORAL NIGHTLY PRN
Qty: 14 TABLET | Refills: 0 | Status: SHIPPED | OUTPATIENT
Start: 2018-08-13 | End: 2018-08-13 | Stop reason: SDUPTHER

## 2018-08-13 RX ORDER — CYCLOBENZAPRINE HCL 10 MG
10 TABLET ORAL NIGHTLY PRN
Qty: 14 TABLET | Refills: 0 | Status: SHIPPED | OUTPATIENT
Start: 2018-08-13 | End: 2018-09-12
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
## 2018-08-16 ENCOUNTER — HOSPITAL ENCOUNTER (OUTPATIENT)
Dept: PHYSICAL THERAPY | Facility: HOSPITAL | Age: 58
Setting detail: THERAPIES SERIES
Discharge: HOME | End: 2018-08-16

## 2018-08-16 DIAGNOSIS — M54.2 NECK PAIN: Primary | ICD-10-CM

## 2018-08-16 PROCEDURE — 97032 APPL MODALITY 1+ESTIM EA 15: CPT | Performed by: PHYSICAL THERAPIST

## 2018-08-16 PROCEDURE — 97110 THERAPEUTIC EXERCISES: CPT | Performed by: PHYSICAL THERAPIST

## 2018-08-16 NOTE — THERAPY PROGRESS REPORT/RE-CERT
Outpatient Physical Therapy Ortho Re-Assessment   [place]     Patient Name: Анна Turpin  : 1960  MRN: [id number]  Today's Date: 2018      Visit Date: 2018    Patient Active Problem List   Diagnosis   • Anxiety   • Oral thrush        Past Medical History:   Diagnosis Date   • Allergic    • Asthma         Past Surgical History:   Procedure Laterality Date   • EYE SURGERY     • TONSILLECTOMY         Visit Dx:     ICD-10-CM ICD-9-CM   1. Neck pain M54.2 723.1                 PT Ortho     Row Name 18 0800       Subjective Comments    Subjective Comments Pt reports she f/u with PCP this week, encouraged to continue PT and was given a new muscle relaxer to assist with improved pain mgmt and decrease muscle tightness. She states she has been training for work out of town recently, noted increased shoulder blade pain but improved neck pain. States she has been unpacking/moving with increased stress.   -CP       Subjective Pain    Able to rate subjective pain? yes  -CP    Pre-Treatment Pain Level 0   at rest; 4/10 with movement  -CP       Cervical Palpation    Suboccipital Bilateral:;Guarded/taut  -CP    Upper Traps Left:;Guarded/taut  -CP       Cervical/Thoracic Special Tests    Cervical Distraction (Foraminal Compression vs. Facet Pain) Positive  -CP    Lashawn's Test (TOS) Negative  -CP    Tinel's Sign (TOS) Negative  -CP       Head/Neck/Trunk    Neck Extension AROM 45  -CP    Neck Flexion AROM 53  -CP    Neck Lt Lateral Flexion AROM 26  -CP    Neck Rt Lateral Flexion AROM 27  -CP    Neck Lt Rotation AROM 79  -CP    Neck Rt Rotation AROM 80   R  -CP       Left Upper Ext    Lt Shoulder Abduction AROM 180  -CP    Lt Shoulder Extension AROM 60  -CP    Lt Shoulder Flexion AROM 160  -CP    Lt Shoulder External Rotation AROM WFL  -CP    Lt Shoulder Internal Rotation AROM T6  -CP       Left Shoulder (Manual Muscle Testing)    Left Shoulder Manual Muscle Testing (MMT)  flexion;extension;abduction;external rotation;internal rotation  -CP    MMT: Flexion, Left Shoulder flexion  -CP    MMT, Gross Movement: Left Shoulder Flexion (4+/5) good plus  -CP    MMT: Extension, Left Shoulder extension  -CP    MMT, Gross Movement: Left Shoulder Extension (5/5) normal  -CP    MMT: ABduction, Left Shoulder abduction  -CP    MMT, Gross Movement: Left Shoulder ABduction (4+/5) good plus  -CP    MMT: Internal Rotation, Left Shoulder internal rotation  -CP    MMT, Gross Movement: Left Shoulder Internal Rotation (5/5) normal  -CP    MMT: External Rotation, Left Shoulder external rotation  -CP    MMT, Gross Movement: Left Shoulder External Rotation (5/5) normal  -CP       Right Shoulder (Manual Muscle Testing)    Comment, MMT: Right Shoulder WFL 5/5  -CP       Sensation    Sensation WNL? WNL  -CP    Light Touch No apparent deficits  -CP      User Key  (r) = Recorded By, (t) = Taken By, (c) = Cosigned By    Initials Name Provider Type    CP Perkins, Corinne E, PT Physical Therapist                                  PT OP Goals     Row Name 08/16/18 0800          PT Short Term Goals    STG Date to Achieve 08/30/18  -CP     STG 1 Cervical pain is reduced by at least 25%.  -CP     STG 1 Progress Met  -CP     STG 2 Patient reports of reduced frequency or intensity of headaches by at least 50%.  -CP     STG 2 Progress Ongoing;Partially Met  -CP     STG 2 Progress Comments with work intermittently and with prolonged driving only.   -CP     STG 3 Patient will be instructed in home exercise program for improved functional mobility and stabilization of the spine.  -CP     STG 3 Progress Met  -CP        Long Term Goals    LTG Date to Achieve 09/15/18  -CP     LTG 1 NDI score is improved by at least 10%.  -CP     LTG 1 Progress Ongoing  -CP     LTG 2 Patient is independent with long term HEP for improved postural awareness and stabilization.  -CP     LTG 2 Progress Ongoing  -CP     LTG 3 Patient will return to  previous functional status for ADLs/ vocational/recreational/sports activities as identified by patient, including increased walking and lifting 5lb weights for exercise routine.   -CP     LTG 3 Progress Ongoing  -CP        Time Calculation    PT Goal Re-Cert Due Date 10/17/18  -CP       User Key  (r) = Recorded By, (t) = Taken By, (c) = Cosigned By    Initials Name Provider Type    CP Perkins, Corinne E, PT Physical Therapist                PT Assessment/Plan     Row Name 08/16/18 0800          PT Assessment    Functional Limitations Limitation in home management;Limitations in community activities;Performance in leisure activities;Performance in work activities  -CP     Impairments Range of motion;Posture;Poor body mechanics;Pain;Muscle strength;Impaired flexibility  -CP     Assessment Comments Pt is making slow progress towards her functional goals, reports noncompliance recently with HEP and increased personal life stressors. She presents with increased bilat UT guarding and continued scapular pain. Pt educated on importance of improved compliance with HEP and stretching and stress mgmt for decreased pain. She would benefit from skilled PT to continue to decrease pain, improve cervical AROM, strength, and return to PLOF.   -CP     Please refer to paper survey for additional self-reported information Yes  -CP     Rehab Potential Good  -CP     Patient/caregiver participated in establishment of treatment plan and goals Yes  -CP     Patient would benefit from skilled therapy intervention Yes  -CP        PT Plan    PT Frequency 2x/week  -CP     Predicted Duration of Therapy Intervention (Therapy Eval) 6 visits  -CP     Planned CPT's? PT EVAL LOW COMPLEXITY: 59802;PT RE-EVAL: 28956;PT THER PROC EA 15 MIN: 77798;PT MANUAL THERAPY EA 15 MIN: 34956;PT ELECTRICAL STIM UNATTEND: ;PT ULTRASOUND EA 15 MIN: 54252;PT TRACTION CERVICAL: 61668;PT HOT/COLD PACK WC NONMCARE: 08685;PT IONTOPHORESIS EA 15 MIN: 95423  -CP     PT  Plan Comments Assess response from trial of estim. Progress HEP next visit if improved compliance noted and continue with manual techniques as appropriate.   -CP       User Key  (r) = Recorded By, (t) = Taken By, (c) = Cosigned By    Initials Name Provider Type    CP Perkins, Corinne E, PT Physical Therapist                Modalities     Row Name 08/16/18 0800             ELECTRICAL STIMULATION    Attended/Unattended Unattended   supine  -CP      Stimulation Type IFC  -CP      Location/Electrode Placement/Other bilat UT, bilat levator scap  -CP      80972 - PT Electrical Stimulation (Manual) Minutes 10  -CP        User Key  (r) = Recorded By, (t) = Taken By, (c) = Cosigned By    Initials Name Provider Type    CP Perkins, Corinne E, PT Physical Therapist              Exercises     Row Name 08/16/18 0800             Subjective Comments    Subjective Comments Pt reports she f/u with PCP this week, encouraged to continue PT and was given a new muscle relaxer to assist with improved pain mgmt and decrease muscle tightness. She states she has been training for work out of town recently, noted increased shoulder blade pain but improved neck pain. States she has been unpacking/moving with increased stress.   -CP         Subjective Pain    Able to rate subjective pain? yes  -CP      Pre-Treatment Pain Level 0   at rest; 4/10 with movement  -CP      Post-Treatment Pain Level 0  -CP         Total Minutes    43292 - PT Therapeutic Exercise Minutes 35  -CP         Exercise 1    Exercise Name 1 Reassessment completed this date in clinic.   -CP         Exercise 3    Exercise Name 3 supine UT stretch  -CP      Reps 3 2x each  -CP         Exercise 6    Exercise Name 6 levator stretch  -CP      Sets 6 3  -CP      Time 6 30 seconds  -CP        User Key  (r) = Recorded By, (t) = Taken By, (c) = Cosigned By    Initials Name Provider Type    CP Perkins, Corinne E, PT Physical Therapist                        Outcome Measure Options: Neck  Disability Index (NDI), Quick DASH  Quick DASH  Open a tight or new jar.: No Difficulty  Do heavy household chores (e.g., wash walls, wash floors): Mild Difficulty  Carry a shopping bag or briefcase: Mild Difficulty  Wash your back: Moderate Difficulty  Use a knife to cut food: No Difficulty  Recreational activities in which you take some force or impact through your arm, should or hand (e.g. golf, hammering, tennis, etc.): Mild Difficulty  During the past week, to what extent has your arm, shoulder, or hand problem interfered with your normal social activites with family, friends, neighbors or groups?: Slightly  During the past week, were you limited in your work or other regular daily activities as a result of your arm, shoulder or hand problem?: Slightly Limited  Arm, Shoulder, or hand pain: Mild  Tingling (pins and needles) in your arm, shoulder, or hand: None  During the past week, how much difficulty have you had sleeping because of the pain in your arm, shoulder or hand?: Mild Difficulty  Number of Questions Answered: 11  Quick DASH Score: 20.45  Neck Disability Index  Section 1 - Pain Intensity: The pain is very mild at the moment.  Section 2 - Personal Care: I can look after myself normally without causing extra pain.  Section 3 - Lifting: I can lift heavy weights without causing extra pain  Section 4 - Work: I can only do my usual work, but no more  Section 5 - Headaches: I have moderate headaches that come infrequently.  Section 6 - Concentration: I can concentrate fully with slight difficulty.  Section 7 - Sleeping: My sleep is slightly disturbed for less than 1 hour.  Section 8 - Driving: I can drive as long as I want with moderate neck pain.  Section 9 - Reading: I can read as much as I want with moderate neck pain.  Section 10 - Recreation: I have some neck pain with all recreational activities.  Neck Disability Index Score: 11      Time Calculation:     Therapy Suggested Charges     Code   Minutes  Charges    34163 (CPT®) Hc Pt Neuromusc Re Education Ea 15 Min      53072 (CPT®) Hc Pt Ther Proc Ea 15 Min 35 2    64440 (CPT®) Hc Gait Training Ea 15 Min      11779 (CPT®) Hc Pt Therapeutic Act Ea 15 Min      14756 (CPT®) Hc Pt Manual Therapy Ea 15 Min      45545 (CPT®) Hc Pt Ther Massage- Per 15 Min      93553 (CPT®) Hc Pt Iontophoresis Ea 15 Min      85935 (CPT®) Hc Pt Elec Stim Ea-Per 15 Min 10 1    15200 (CPT®) Hc Pt Ultrasound Ea 15 Min      59659 (CPT®) Hc Pt Self Care/Mgmt/Train Ea 15 Min      44002 (CPT®) Hc Pt Prosthetic (S) Train Initial Encounter, Each 15 Min      10403 (CPT®) Hc Orthotic(S) Mgmt/Train Initial Encounter, Each 15min      10989 (CPT®) Hc Pt Aquatic Therapy Ea 15 Min      52275 (CPT®) Hc Pt Orthotic(S)/Prosthetic(S) Encounter, Each 15 Min      Total  45 3          Start Time: 0802  Total Timed Code Minutes- PT: 45 minute(s)     Therapy Charges for Today     Code Description Service Date Service Provider Modifiers Qty    25174810874 HC PT THER PROC EA 15 MIN 8/16/2018 Perkins, Corinne E, PT GP 2    12106156710 HC PT ELEC STIM EA-PER 15 MIN 8/16/2018 Perkins, Corinne E, PT GP 1          PT G-Codes  Outcome Measure Options: Neck Disability Index (NDI), Quick DASH         Corinne E. Perkins, PT  8/16/2018

## 2018-08-21 ENCOUNTER — HOSPITAL ENCOUNTER (OUTPATIENT)
Dept: PHYSICAL THERAPY | Facility: HOSPITAL | Age: 58
Setting detail: THERAPIES SERIES
Discharge: HOME | End: 2018-08-21

## 2018-08-21 DIAGNOSIS — M54.2 NECK PAIN: Primary | ICD-10-CM

## 2018-08-21 PROCEDURE — 97110 THERAPEUTIC EXERCISES: CPT | Performed by: PHYSICAL THERAPIST

## 2018-08-21 PROCEDURE — 97140 MANUAL THERAPY 1/> REGIONS: CPT | Performed by: PHYSICAL THERAPIST

## 2018-08-21 NOTE — THERAPY TREATMENT NOTE
Outpatient Physical Therapy Ortho Treatment Note  [facility]     Patient Name: Анна Turpin  : 1960  MRN: [id number]  Today's Date: 2018      Visit Date: 2018    Visit Dx:    ICD-10-CM ICD-9-CM   1. Neck pain M54.2 723.1       Patient Active Problem List   Diagnosis   • Anxiety   • Oral thrush        Past Medical History:   Diagnosis Date   • Allergic    • Asthma         Past Surgical History:   Procedure Laterality Date   • EYE SURGERY     • TONSILLECTOMY                               PT Assessment/Plan     Row Name 18 0800          PT Assessment    Assessment Comments Pt demonstrated restriction L cervical rotation to 74 deg, improved with manual techniques. Increased tightness R SCM noted, educated to add SCM stretch to current HEP. Relief with R mid to low cervical facet pain noted with distraction.   -CP        PT Plan    PT Plan Comments Progress standing CKC strengthening ther ex and stretches as tolerated. Assess compliance with HEP.   -CP       User Key  (r) = Recorded By, (t) = Taken By, (c) = Cosigned By    Initials Name Provider Type    CP Perkins, Corinne E, PT Physical Therapist                    Exercises     Row Name 18 0800             Subjective Comments    Subjective Comments Pt states her muscle relaxers have been helping decrease her overall pain, states her headache freq has decreased.   -CP         Subjective Pain    Able to rate subjective pain? yes  -CP      Pre-Treatment Pain Level 0  -CP      Post-Treatment Pain Level 0  -CP         Total Minutes    01731 - PT Therapeutic Exercise Minutes 24  -CP      29856 - PT Manual Therapy Minutes 15  -CP         Exercise 1    Exercise Name 1 UBE SciFit Level 2.0  -CP      Time 1 2 min forward, 2 min backward; 4 min total  -CP         Exercise 2    Exercise Name 2 Quadruped chin tucks  -CP      Reps 2 10  -CP         Exercise 3    Exercise Name 3 Childpose stretch  -CP      Reps 3 3  -CP      Time 3 20 seconds each   -CP         Exercise 4    Exercise Name 4 Cat/camel stretch  -CP      Reps 4 10  -CP         Exercise 5    Exercise Name 5 SCM stretch  -CP      Reps 5 3  -CP         Exercise 6    Exercise Name 6 levator stretch  -CP      Sets 6 3  -CP      Time 6 30 seconds  -CP         Exercise 7    Exercise Name 7 middle rows BTB  -CP      Reps 7 15  -CP      Additional Comments BTB  -CP         Exercise 8    Exercise Name 8 low rows  -CP      Reps 8 15  -CP      Additional Comments BTB  -CP         Exercise 9    Exercise Name 9 sternal lifts  -CP      Reps 9 15  -CP      Additional Comments BTB  -CP         Exercise 10    Exercise Name 10 Wall push up plus  -CP      Cueing 10 Demo;Verbal  -CP      Reps 10 12x  -CP         Exercise 11    Exercise Name 11 Standing CKC scap retraction/protraction  -CP      Cueing 11 Verbal;Demo  -CP      Reps 11 10x  -CP         Exercise 12    Exercise Name 12 Quadruped cervical/thoracic rotation  -CP      Reps 12 5x each  -CP        User Key  (r) = Recorded By, (t) = Taken By, (c) = Cosigned By    Initials Name Provider Type    CP Perkins, Corinne E, PT Physical Therapist                        Manual Rx (last 36 hours)      Manual Treatments     Row Name 08/21/18 0800             Total Minutes    16750 - PT Manual Therapy Minutes 15  -CP         Manual Rx 1    Manual Rx 1 Location cervical spine  -CP      Manual Rx 1 Type suboccipital distraction and STM techniques, unilateral PA/AP, SGL's, manual trap stretching, cervical distraction, SCM stretching  -CP      Manual Rx 1 Duration 15  -CP        User Key  (r) = Recorded By, (t) = Taken By, (c) = Cosigned By    Initials Name Provider Type    CP Perkins, Corinne E, PT Physical Therapist                             Time Calculation:   Start Time: 0800  Total Timed Code Minutes- PT: 39 minute(s)  Therapy Suggested Charges     Code   Minutes Charges    19617 (CPT®) Hc Pt Neuromusc Re Education Ea 15 Min      40227 (CPT®) Hc Pt Ther Proc Ea 15 Min 24  2    98724 (CPT®) Hc Gait Training Ea 15 Min      41578 (CPT®) Hc Pt Therapeutic Act Ea 15 Min      01684 (CPT®) Hc Pt Manual Therapy Ea 15 Min 15 1    21702 (CPT®) Hc Pt Ther Massage- Per 15 Min      91500 (CPT®) Hc Pt Iontophoresis Ea 15 Min      76078 (CPT®) Hc Pt Elec Stim Ea-Per 15 Min      82421 (CPT®) Hc Pt Ultrasound Ea 15 Min      59211 (CPT®) Hc Pt Self Care/Mgmt/Train Ea 15 Min      25193 (CPT®) Hc Pt Prosthetic (S) Train Initial Encounter, Each 15 Min      22815 (CPT®) Hc Orthotic(S) Mgmt/Train Initial Encounter, Each 15min      43903 (CPT®) Hc Pt Aquatic Therapy Ea 15 Min      18398 (CPT®) Hc Pt Orthotic(S)/Prosthetic(S) Encounter, Each 15 Min      Total  39 3        Therapy Charges for Today     Code Description Service Date Service Provider Modifiers Qty    28510209138 HC PT THER PROC EA 15 MIN 8/21/2018 Perkins, Corinne E, PT GP 2    79243149329 HC PT MANUAL THERAPY EA 15 MIN 8/21/2018 Perkins, Corinne E, PT GP 1                    Corinne E. Perkins, PT  8/21/2018

## 2018-08-23 ENCOUNTER — HOSPITAL ENCOUNTER (OUTPATIENT)
Dept: PHYSICAL THERAPY | Facility: HOSPITAL | Age: 58
Setting detail: THERAPIES SERIES
Discharge: HOME | End: 2018-08-23

## 2018-08-23 DIAGNOSIS — M54.2 NECK PAIN: Primary | ICD-10-CM

## 2018-08-23 PROCEDURE — 97110 THERAPEUTIC EXERCISES: CPT | Performed by: PHYSICAL THERAPIST

## 2018-08-23 PROCEDURE — 97140 MANUAL THERAPY 1/> REGIONS: CPT | Performed by: PHYSICAL THERAPIST

## 2018-08-23 PROCEDURE — 97010 HOT OR COLD PACKS THERAPY: CPT | Performed by: PHYSICAL THERAPIST

## 2018-08-23 NOTE — THERAPY TREATMENT NOTE
Outpatient Physical Therapy Ortho Treatment Note   [facility]     Patient Name: Анна Turpin  : 1960  MRN: [id number]  Today's Date: 2018      Visit Date: 2018    Visit Dx:    ICD-10-CM ICD-9-CM   1. Neck pain M54.2 723.1       Patient Active Problem List   Diagnosis   • Anxiety   • Oral thrush        Past Medical History:   Diagnosis Date   • Allergic    • Asthma         Past Surgical History:   Procedure Laterality Date   • EYE SURGERY     • TONSILLECTOMY                               PT Assessment/Plan     Row Name 18 0800          PT Assessment    Assessment Comments Pt able to maintain improvements in cervical AROM with decreased overall neck pain. Increased guarding and pain along right scapular border and levator scap. Improved after manual techniques today.   -CP        PT Plan    PT Plan Comments Progress manual as tolerated along right scapular border. Reassess cervical ROM if needed next visit.   -CP       User Key  (r) = Recorded By, (t) = Taken By, (c) = Cosigned By    Initials Name Provider Type    CP Perkins, Corinne E, PT Physical Therapist                Modalities     Row Name 18 0800             Moist Heat    MH Applied Yes   pt prone  -CP      Location right scapula, UT, levator scap  -CP      Rx Minutes 10 mins  -CP      MH Prior to Rx No  -CP      MH S/P Rx Yes  -CP        User Key  (r) = Recorded By, (t) = Taken By, (c) = Cosigned By    Initials Name Provider Type    CP Perkins, Corinne E, PT Physical Therapist                Exercises     Row Name 18 0800             Subjective Comments    Subjective Comments Pt reports her neck pain is feeling better but states she has noticed more right shoulder pain along scapular border.   -CP         Subjective Pain    Able to rate subjective pain? yes  -CP      Pre-Treatment Pain Level 3  -CP      Post-Treatment Pain Level 1  -CP         Total Minutes    83661 - PT Therapeutic Exercise Minutes 18  -CP      43191 -  PT Manual Therapy Minutes 10  -CP         Exercise 1    Exercise Name 1 UBE SciFit Level 2.0  -CP      Time 1 2 min forward, 2 min backward; 4 min total  -CP         Exercise 2    Exercise Name 2 Quadruped chin tucks  -CP      Reps 2 10  -CP         Exercise 3    Exercise Name 3 Childpose stretch  -CP      Reps 3 3  -CP      Time 3 20 seconds each  -CP         Exercise 4    Exercise Name 4 Cat/camel stretch  -CP      Reps 4 10  -CP         Exercise 5    Exercise Name 5 SCM stretch  -CP      Sets 5 3  -CP         Exercise 7    Exercise Name 7 middle rows BTB  -CP      Reps 7 15  -CP      Additional Comments BTB  -CP         Exercise 8    Exercise Name 8 low rows  -CP      Reps 8 15  -CP      Additional Comments BTB  -CP         Exercise 9    Exercise Name 9 sternal lifts  -CP      Reps 9 15  -CP      Additional Comments BTB  -CP         Exercise 10    Exercise Name 10 Wall push up plus  -CP      Reps 10 12x  -CP         Exercise 11    Exercise Name 11 IR/ER   -CP      Reps 11 12x each  -CP      Additional Comments BTB  -CP        User Key  (r) = Recorded By, (t) = Taken By, (c) = Cosigned By    Initials Name Provider Type    CP Fuentes, Corinne E, PT Physical Therapist                        Manual Rx (last 36 hours)      Manual Treatments     Row Name 08/23/18 0800             Total Minutes    02135 - PT Manual Therapy Minutes 10  -CP         Manual Rx 2    Manual Rx 2 Location Right scapular border, inf border, UT, levator scap, supraspinatus  -CP      Manual Rx 2 Type STM techniques to decrease pain and guarding.  -CP      Manual Rx 2 Duration 10  -CP        User Key  (r) = Recorded By, (t) = Taken By, (c) = Cosigned By    Initials Name Provider Type    CP Fuentes, Corinne E, PT Physical Therapist                             Time Calculation:   Start Time: 0800  Total Timed Code Minutes- PT: 28 minute(s)  Therapy Suggested Charges     Code   Minutes Charges    30247 (CPT®) Hc Pt Neuromusc Re Education Ea 15 Min       77196 (CPT®) Hc Pt Ther Proc Ea 15 Min 18 1    06309 (CPT®) Hc Gait Training Ea 15 Min      58039 (CPT®) Hc Pt Therapeutic Act Ea 15 Min      48248 (CPT®) Hc Pt Manual Therapy Ea 15 Min 10 1    65065 (CPT®) Hc Pt Ther Massage- Per 15 Min      94161 (CPT®) Hc Pt Iontophoresis Ea 15 Min      05029 (CPT®) Hc Pt Elec Stim Ea-Per 15 Min      48303 (CPT®) Hc Pt Ultrasound Ea 15 Min      46414 (CPT®) Hc Pt Self Care/Mgmt/Train Ea 15 Min      81210 (CPT®) Hc Pt Prosthetic (S) Train Initial Encounter, Each 15 Min      07457 (CPT®) Hc Orthotic(S) Mgmt/Train Initial Encounter, Each 15min      14918 (CPT®) Hc Pt Aquatic Therapy Ea 15 Min      73899 (CPT®) Hc Pt Orthotic(S)/Prosthetic(S) Encounter, Each 15 Min      Total  28 2        Therapy Charges for Today     Code Description Service Date Service Provider Modifiers Qty    18858066991 HC PT THER PROC EA 15 MIN 8/23/2018 Perkins, Corinne E, PT GP 1    85229359280 HC PT MANUAL THERAPY EA 15 MIN 8/23/2018 Perkins, Corinne E, PT GP 1    63496261112 HC PT HOT/COLD PACK WC NONMCARE 8/23/2018 Perkins, Corinne E, PT GP 1                    Corinne E. Perkins, PT  8/23/2018

## 2018-08-28 ENCOUNTER — HOSPITAL ENCOUNTER (OUTPATIENT)
Dept: PHYSICAL THERAPY | Facility: HOSPITAL | Age: 58
Setting detail: THERAPIES SERIES
Discharge: HOME | End: 2018-08-28

## 2018-08-28 DIAGNOSIS — M54.2 NECK PAIN: Primary | ICD-10-CM

## 2018-08-28 PROCEDURE — 97110 THERAPEUTIC EXERCISES: CPT | Performed by: PHYSICAL THERAPIST

## 2018-08-28 NOTE — THERAPY TREATMENT NOTE
Outpatient Physical Therapy Ortho Treatment Note  [facility]     Patient Name: Анна Turpin  : 1960  MRN: [id number]  Today's Date: 2018      Visit Date: 2018    Visit Dx:    ICD-10-CM ICD-9-CM   1. Neck pain M54.2 723.1       Patient Active Problem List   Diagnosis   • Anxiety   • Oral thrush        Past Medical History:   Diagnosis Date   • Allergic    • Asthma         Past Surgical History:   Procedure Laterality Date   • EYE SURGERY     • TONSILLECTOMY                               PT Assessment/Plan     Row Name 18 1500          PT Assessment    Assessment Comments Pt demonstrated full, functional RUE AROM and improved cervical rotation with pain 1/10. She is progressing in clinic; however verbalized noncompliance with HEP.   -CP        PT Plan    PT Plan Comments Progress manual techniques as needed next visit along R scapular border.   -CP       User Key  (r) = Recorded By, (t) = Taken By, (c) = Cosigned By    Initials Name Provider Type    CP Perkins, Corinne E, PT Physical Therapist                    Exercises     Row Name 18 1500             Subjective Comments    Subjective Comments Pt states she has been doing better, reports less pain since last visit. She states she hasn't been doing HEP.   -CP         Subjective Pain    Able to rate subjective pain? yes  -CP      Pre-Treatment Pain Level 1  -CP      Post-Treatment Pain Level 1  -CP         Total Minutes    20025 - PT Therapeutic Exercise Minutes 23  -CP      29151 - PT Manual Therapy Minutes 6  -CP         Exercise 2    Exercise Name 2 Quadruped chin tucks  -CP      Reps 2 10  -CP         Exercise 3    Exercise Name 3 Childpose stretch  -CP      Reps 3 5  -CP      Time 3 20 seconds each  -CP         Exercise 4    Exercise Name 4 Cat/camel stretch  -CP      Reps 4 15  -CP         Exercise 5    Exercise Name 5 Supine ER weighted with 2lb  -CP      Reps 5 10x  -CP         Exercise 6    Exercise Name 6 Supine serratus  punch weighted  -CP      Reps 6 15x   -CP      Additional Comments 2lb DB  -CP         Exercise 7    Exercise Name 7 middle rows BTB  -CP      Reps 7 15  -CP         Exercise 8    Exercise Name 8 low rows  -CP      Reps 8 15  -CP      Additional Comments BTB  -CP         Exercise 9    Exercise Name 9 sternal lifts  -CP      Reps 9 15  -CP      Additional Comments BTB  -CP         Exercise 10    Exercise Name 10 Wall push up plus  -CP      Reps 10 15x  -CP         Exercise 11    Exercise Name 11 IR/ER   -CP      Reps 11 12x each  -CP      Additional Comments BTB  -CP         Exercise 12    Exercise Name 12 Quadruped cervical/thoracic rotation  -CP      Reps 12 8x  -CP        User Key  (r) = Recorded By, (t) = Taken By, (c) = Cosigned By    Initials Name Provider Type    CP Perkins, Corinne E, PT Physical Therapist                        Manual Rx (last 36 hours)      Manual Treatments     Row Name 08/28/18 1500             Total Minutes    09094 - PT Manual Therapy Minutes 6  -CP         Manual Rx 1    Manual Rx 1 Location right medial scapular border  -CP      Manual Rx 1 Type STM techniques  -CP      Manual Rx 1 Duration 6  -CP        User Key  (r) = Recorded By, (t) = Taken By, (c) = Cosigned By    Initials Name Provider Type    CP Perkins, Corinne E, PT Physical Therapist                             Time Calculation:   Start Time: 1515  Total Timed Code Minutes- PT: 29 minute(s)  Therapy Suggested Charges     Code   Minutes Charges    62125 (CPT®) Hc Pt Neuromusc Re Education Ea 15 Min      60142 (CPT®) Hc Pt Ther Proc Ea 15 Min 23 2    43904 (CPT®) Hc Gait Training Ea 15 Min      80705 (CPT®) Hc Pt Therapeutic Act Ea 15 Min      86558 (CPT®) Hc Pt Manual Therapy Ea 15 Min 6     02570 (CPT®) Hc Pt Ther Massage- Per 15 Min      78530 (CPT®) Hc Pt Iontophoresis Ea 15 Min      91266 (CPT®) Hc Pt Elec Stim Ea-Per 15 Min      06076 (CPT®) Hc Pt Ultrasound Ea 15 Min      43632 (CPT®) Hc Pt Self Care/Mgmt/Train Ea 15  Min      26520 (CPT®) Hc Pt Prosthetic (S) Train Initial Encounter, Each 15 Min      49233 (CPT®) Hc Orthotic(S) Mgmt/Train Initial Encounter, Each 15min      67871 (CPT®) Hc Pt Aquatic Therapy Ea 15 Min      88612 (CPT®) Hc Pt Orthotic(S)/Prosthetic(S) Encounter, Each 15 Min      Total  29 2        Therapy Charges for Today     Code Description Service Date Service Provider Modifiers Qty    17090124890 HC PT THER PROC EA 15 MIN 8/28/2018 Perkins, Corinne E, PT GP 2                    Corinne E. Perkins, PT  8/28/2018

## 2018-08-30 ENCOUNTER — HOSPITAL ENCOUNTER (OUTPATIENT)
Dept: PHYSICAL THERAPY | Facility: HOSPITAL | Age: 58
Setting detail: THERAPIES SERIES
Discharge: HOME | End: 2018-08-30

## 2018-08-30 DIAGNOSIS — M54.2 NECK PAIN: Primary | ICD-10-CM

## 2018-08-30 PROCEDURE — 97110 THERAPEUTIC EXERCISES: CPT | Performed by: PHYSICAL THERAPIST

## 2018-09-06 ENCOUNTER — HOSPITAL ENCOUNTER (OUTPATIENT)
Dept: PHYSICAL THERAPY | Facility: HOSPITAL | Age: 58
Setting detail: THERAPIES SERIES
Discharge: HOME | End: 2018-09-06

## 2018-09-06 DIAGNOSIS — M54.2 NECK PAIN: Primary | ICD-10-CM

## 2018-09-06 PROCEDURE — 97110 THERAPEUTIC EXERCISES: CPT | Performed by: PHYSICAL THERAPIST

## 2018-09-06 NOTE — THERAPY TREATMENT NOTE
Outpatient Physical Therapy Ortho Treatment Note  [facility]     Patient Name: Анна Turpin  : 1960  MRN: [id number]  Today's Date: 2018      Visit Date: 2018    Visit Dx:    ICD-10-CM ICD-9-CM   1. Neck pain M54.2 723.1       Patient Active Problem List   Diagnosis   • Anxiety   • Oral thrush        Past Medical History:   Diagnosis Date   • Allergic    • Asthma         Past Surgical History:   Procedure Laterality Date   • EYE SURGERY     • TONSILLECTOMY                               PT Assessment/Plan     Row Name 18 0800          PT Assessment    Assessment Comments Pt tolerated increased resistance and reps with strengthening ther exercises, pain noted 0/10. Pt verbalized understanding of HEP, given BTB to progress resistance at home. Pt has f/u with PCP next week.   -CP        PT Plan    PT Plan Comments Could add D1, D2 for functional training. Anticipate decrease freq to 1x/week after MD f/u to [facility] visits and increase emphasis on HEP.   -CP       User Key  (r) = Recorded By, (t) = Taken By, (c) = Cosigned By    Initials Name Provider Type    CP Perkins, Corinne E, PT Physical Therapist                    Exercises     Row Name 18 0800             Subjective Comments    Subjective Comments Pt states she was able to rest this weekend and is feeling better.   -CP         Subjective Pain    Able to rate subjective pain? yes  -CP      Pre-Treatment Pain Level 0  -CP      Post-Treatment Pain Level 0  -CP         Total Minutes    71378 - PT Therapeutic Exercise Minutes 30  -CP         Exercise 1    Exercise Name 1 UE SciFit Level 2.5  -CP      Time 1 2 min forward, 2 min backward  -CP         Exercise 2    Exercise Name 2 Quadruped chin tucks  -CP      Sets 2 --  -CP      Reps 2 15  -CP         Exercise 3    Exercise Name 3 Quadruped cat/camel, childpose stretch  -CP      Reps 3 8x  -CP      Additional Comments each  -CP         Exercise 4    Exercise Name 4 Sidelying RUE  abduction with PT assist for upward rotation  -CP      Reps 4 10x  -CP      Additional Comments impingement type symptoms noted in supraspinatus region when PT not assisting end range upward rotation cueing  -CP         Exercise 5    Exercise Name 5 Prone mid rows  -CP      Sets 5 2  -CP      Reps 5 10x  -CP      Additional Comments both with 3lb weights  -CP         Exercise 6    Exercise Name 6 Supine serratus punch weighted  -CP      Reps 6 15x   -CP      Additional Comments 3lb DB  -CP         Exercise 7    Exercise Name 7 Prone T, Y  -CP      Sets 7 2  -CP      Reps 7 12  -CP      Additional Comments 3lb weight with Y kick backs  -CP         Exercise 8    Exercise Name 8 lateral raises, forward raises  -CP      Cueing 8 Verbal  -CP      Sets 8 2  -CP      Reps 8 10  -CP      Additional Comments 2lb DB  -CP         Exercise 9    Exercise Name 9 sternal lifts  -CP      Sets 9 2  -CP      Reps 9 15  -CP      Additional Comments BTB  -CP         Exercise 10    Exercise Name 10 Wall push up plus  -CP      Sets 10 2  -CP      Reps 10 10x  -CP        User Key  (r) = Recorded By, (t) = Taken By, (c) = Cosigned By    Initials Name Provider Type    CP Perkins, Corinne E, PT Physical Therapist                                            Time Calculation:   Start Time: 0805  Total Timed Code Minutes- PT: 30 minute(s)  Therapy Suggested Charges     Code   Minutes Charges    72583 (CPT®) Hc Pt Neuromusc Re Education Ea 15 Min      43761 (CPT®) Hc Pt Ther Proc Ea 15 Min 30 2    63681 (CPT®) Hc Gait Training Ea 15 Min      15174 (CPT®) Hc Pt Therapeutic Act Ea 15 Min      14062 (CPT®) Hc Pt Manual Therapy Ea 15 Min      23846 (CPT®) Hc Pt Ther Massage- Per 15 Min      80512 (CPT®) Hc Pt Iontophoresis Ea 15 Min      06462 (CPT®) Hc Pt Elec Stim Ea-Per 15 Min      11670 (CPT®) Hc Pt Ultrasound Ea 15 Min      24739 (CPT®) Hc Pt Self Care/Mgmt/Train Ea 15 Min      62084 (CPT®) Hc Pt Prosthetic (S) Train Initial Encounter, Each 15  Min      88958 (CPT®) Hc Orthotic(S) Mgmt/Train Initial Encounter, Each 15min      27451 (CPT®) Hc Pt Aquatic Therapy Ea 15 Min      71943 (CPT®) Hc Pt Orthotic(S)/Prosthetic(S) Encounter, Each 15 Min      Total  30 2        Therapy Charges for Today     Code Description Service Date Service Provider Modifiers Qty    12752429644 HC PT THER PROC EA 15 MIN 9/6/2018 Perkins, Corinne E, PT GP 2                    Corinne E. Perkins, PT  9/6/2018

## 2018-09-11 ENCOUNTER — HOSPITAL ENCOUNTER (OUTPATIENT)
Dept: PHYSICAL THERAPY | Facility: HOSPITAL | Age: 58
Setting detail: THERAPIES SERIES
Discharge: HOME | End: 2018-09-11

## 2018-09-11 DIAGNOSIS — M54.2 NECK PAIN: Primary | ICD-10-CM

## 2018-09-11 PROCEDURE — 97110 THERAPEUTIC EXERCISES: CPT | Performed by: PHYSICAL THERAPIST

## 2018-09-11 PROCEDURE — 97140 MANUAL THERAPY 1/> REGIONS: CPT | Performed by: PHYSICAL THERAPIST

## 2018-09-11 NOTE — THERAPY TREATMENT NOTE
Outpatient Physical Therapy Ortho Treatment Note   [facility]     Patient Name: Анна Turpin  : 1960  MRN: [id number]  Today's Date: 2018      Visit Date: 2018    Visit Dx:    ICD-10-CM ICD-9-CM   1. Neck pain M54.2 723.1       Patient Active Problem List   Diagnosis   • Anxiety   • Oral thrush        Past Medical History:   Diagnosis Date   • Allergic    • Asthma         Past Surgical History:   Procedure Laterality Date   • EYE SURGERY     • TONSILLECTOMY                               PT Assessment/Plan     Row Name 18 0800          PT Assessment    Assessment Comments Fewer verbal cues required for correct technique with weighted ther ex. She is progressing with functional strength. Mild tightness noted R UT.   -CP        PT Plan    PT Plan Comments Anticipate decrease PT freq to 1x/week, pending pt symptoms this week. Pt has f/u with PCP tomorrow.   -CP       User Key  (r) = Recorded By, (t) = Taken By, (c) = Cosigned By    Initials Name Provider Type    CP Perkins, Corinne E, PT Physical Therapist                    Exercises     Row Name 18 0800             Subjective Comments    Subjective Comments Pt reports she feels like she is getting much better, states she has f/u with PCP tomorrow. She reports mild tightness on top of right UT on arrival, states she hasn't been lifting as much.   -CP         Subjective Pain    Able to rate subjective pain? yes  -CP      Pre-Treatment Pain Level 1  -CP      Post-Treatment Pain Level 0  -CP         Total Minutes    98765 - PT Therapeutic Exercise Minutes 28  -CP      39987 - PT Manual Therapy Minutes 10  -CP         Exercise 1    Exercise Name 1 UE SciFit Level 2.5  -CP      Time 1 2 min forward, 2 min backward  -CP         Exercise 2    Exercise Name 2 D1, D2 weighted  -CP      Sets 2 2  -CP      Reps 2 5  -CP      Additional Comments 1 plates each  -CP         Exercise 3    Exercise Name 3 Cybex mid rows  -CP      Sets 3 2  -CP       Reps 3 12  -CP      Additional Comments 5 plates  -CP         Exercise 4    Exercise Name 4 Single arm row  -CP      Sets 4 2  -CP      Reps 4 12  -CP      Additional Comments 8lb DB  -CP         Exercise 5    Exercise Name 5 UT stretch, levator scap stretch  -CP      Reps 5 2x  -CP         Exercise 6    Exercise Name 6 Supine serratus punch weighted  -CP      Reps 6 15x   -CP      Additional Comments 3lb DB  -CP         Exercise 8    Exercise Name 8 lateral raises, forward raises  -CP      Sets 8 2  -CP      Reps 8 10  -CP      Additional Comments 2lb DB, 3lb DB  -CP         Exercise 9    Exercise Name 9 sternal lifts  -CP      Sets 9 2  -CP      Reps 9 15  -CP      Additional Comments BTB  -CP         Exercise 10    Exercise Name 10 Wall push up plus  -CP      Sets 10 2  -CP      Reps 10 10x  -CP        User Key  (r) = Recorded By, (t) = Taken By, (c) = Cosigned By    Initials Name Provider Type    CP Fuentes, Corinne E, PT Physical Therapist                        Manual Rx (last 36 hours)      Manual Treatments     Row Name 09/11/18 0800             Total Minutes    39595 - PT Manual Therapy Minutes 10  -CP         Manual Rx 1    Manual Rx 1 Location right medial scapular border  -CP      Manual Rx 1 Type STM techniques to decrease guarding along UT, levator scap; iastm to medial scap border  -CP      Manual Rx 1 Duration 10  -CP        User Key  (r) = Recorded By, (t) = Taken By, (c) = Cosigned By    Initials Name Provider Type    CP Fuentes, Corinne E, PT Physical Therapist                             Time Calculation:   Start Time: 0804  Total Timed Code Minutes- PT: 38 minute(s)  Therapy Suggested Charges     Code   Minutes Charges    64247 (CPT®) Hc Pt Neuromusc Re Education Ea 15 Min      48108 (CPT®) Hc Pt Ther Proc Ea 15 Min 28 2    00659 (CPT®) Hc Gait Training Ea 15 Min      21549 (CPT®) Hc Pt Therapeutic Act Ea 15 Min      12809 (CPT®) Hc Pt Manual Therapy Ea 15 Min 10 1    50247 (CPT®) Hc Pt Ther  Massage- Per 15 Min      08405 (CPT®) Hc Pt Iontophoresis Ea 15 Min      51235 (CPT®) Hc Pt Elec Stim Ea-Per 15 Min      94462 (CPT®) Hc Pt Ultrasound Ea 15 Min      32819 (CPT®) Hc Pt Self Care/Mgmt/Train Ea 15 Min      79708 (CPT®) Hc Pt Prosthetic (S) Train Initial Encounter, Each 15 Min      49448 (CPT®) Hc Orthotic(S) Mgmt/Train Initial Encounter, Each 15min      21935 (CPT®) Hc Pt Aquatic Therapy Ea 15 Min      18079 (CPT®) Hc Pt Orthotic(S)/Prosthetic(S) Encounter, Each 15 Min      Total  38 3        Therapy Charges for Today     Code Description Service Date Service Provider Modifiers Qty    62995334554 HC PT THER PROC EA 15 MIN 9/11/2018 Perkins, Corinne E, PT GP 2    45099284538 HC PT MANUAL THERAPY EA 15 MIN 9/11/2018 Perkins, Corinne E, PT GP 1                    Corinne E. Perkins, PT  9/11/2018

## 2018-09-12 ENCOUNTER — OFFICE VISIT (OUTPATIENT)
Dept: INTERNAL MEDICINE | Facility: CLINIC | Age: 58
End: 2018-09-12

## 2018-09-12 VITALS
SYSTOLIC BLOOD PRESSURE: 114 MMHG | HEART RATE: 80 BPM | BODY MASS INDEX: 26.49 KG/M2 | WEIGHT: 146 LBS | RESPIRATION RATE: 16 BRPM | TEMPERATURE: 98.4 F | DIASTOLIC BLOOD PRESSURE: 68 MMHG

## 2018-09-12 DIAGNOSIS — S13.4XXD: Primary | ICD-10-CM

## 2018-09-12 PROCEDURE — 99213 OFFICE O/P EST LOW 20 MIN: CPT | Performed by: INTERNAL MEDICINE
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
## 2018-09-18 ENCOUNTER — HOSPITAL ENCOUNTER (OUTPATIENT)
Dept: PHYSICAL THERAPY | Facility: HOSPITAL | Age: 58
Setting detail: THERAPIES SERIES
Discharge: HOME | End: 2018-09-18

## 2018-09-18 DIAGNOSIS — M54.2 NECK PAIN: Primary | ICD-10-CM

## 2018-09-18 PROCEDURE — 97110 THERAPEUTIC EXERCISES: CPT | Performed by: PHYSICAL THERAPIST

## 2018-09-18 NOTE — THERAPY PROGRESS REPORT/RE-CERT
Outpatient Physical Therapy Ortho Re-Assessment   [place]     Patient Name: Анна Turpin  : 1960  MRN: [id number]  Today's Date: 2018      Visit Date: 2018    Patient Active Problem List   Diagnosis   • Anxiety   • Oral thrush        Past Medical History:   Diagnosis Date   • Allergic    • Asthma         Past Surgical History:   Procedure Laterality Date   • EYE SURGERY     • TONSILLECTOMY         Visit Dx:     ICD-10-CM ICD-9-CM   1. Neck pain M54.2 723.1                 PT Ortho     Row Name 18 0800       Subjective Pain    Able to rate subjective pain? yes  -CP       Cervical Palpation    Cervical Palpation- Location? --   no abnormaliy noted   -CP       Cervical/Thoracic Special Tests    Cervical Distraction (Foraminal Compression vs. Facet Pain) Negative  -CP    Lashawn's Test (TOS) Negative  -CP    Tinel's Sign (TOS) Negative  -CP       Head/Neck/Trunk    Neck Extension AROM 51  -CP    Neck Flexion AROM 60  -CP    Neck Lt Lateral Flexion AROM 30  -CP    Neck Rt Lateral Flexion AROM 31  -CP    Neck Lt Rotation AROM 82  -CP    Neck Rt Rotation AROM 86  -CP       Left Upper Ext    Lt Shoulder Abduction AROM 180  -CP    Lt Shoulder Extension AROM 60  -CP    Lt Shoulder Flexion AROM 180  -CP    Lt Shoulder External Rotation AROM WFL  -CP    Lt Shoulder Internal Rotation AROM T6  -CP    Lt Upper Extremity Comments  RUE WFL  -CP       Left Shoulder (Manual Muscle Testing)    Left Shoulder Manual Muscle Testing (MMT) flexion;extension;abduction;external rotation;internal rotation  -CP    MMT: Flexion, Left Shoulder flexion  -CP    MMT, Gross Movement: Left Shoulder Flexion (5/5) normal  -CP    MMT: Extension, Left Shoulder extension  -CP    MMT, Gross Movement: Left Shoulder Extension (5/5) normal  -CP    MMT: ABduction, Left Shoulder abduction  -CP    MMT, Gross Movement: Left Shoulder ABduction (5/5) normal  -CP    MMT: Internal Rotation, Left Shoulder internal rotation  -CP    MMT, Gross  Movement: Left Shoulder Internal Rotation (5/5) normal  -CP    MMT: External Rotation, Left Shoulder external rotation  -CP    MMT, Gross Movement: Left Shoulder External Rotation (5/5) normal  -CP      User Key  (r) = Recorded By, (t) = Taken By, (c) = Cosigned By    Initials Name Provider Type    CP Perkins, Corinne E, PT Physical Therapist                                  PT OP Goals     Row Name 09/18/18 0800          PT Short Term Goals    STG Date to Achieve 08/30/18  -CP     STG 1 Cervical pain is reduced by at least 25%.  -CP     STG 1 Progress Met  -CP     STG 2 Patient reports of reduced frequency or intensity of headaches by at least 50%.  -CP     STG 2 Progress Met  -CP     STG 3 Patient will be instructed in home exercise program for improved functional mobility and stabilization of the spine.  -CP     STG 3 Progress Met  -CP        Long Term Goals    LTG Date to Achieve 09/15/18  -CP     LTG 1 NDI score is improved by at least 10%.  -CP     LTG 1 Progress Met  -CP     LTG 2 Patient is independent with long term HEP for improved postural awareness and stabilization.  -CP     LTG 2 Progress Ongoing;Progressing  -CP     LTG 3 Patient will return to previous functional status for ADLs/ vocational/recreational/sports activities as identified by patient, including increased walking and lifting 5lb weights for exercise routine.   -CP     LTG 3 Progress Progressing  -CP        Time Calculation    PT Goal Re-Cert Due Date 10/17/18  -CP       User Key  (r) = Recorded By, (t) = Taken By, (c) = Cosigned By    Initials Name Provider Type    CP Perkins, Corinne E, PT Physical Therapist                PT Assessment/Plan     Row Name 09/18/18 0800          PT Assessment    Impairments Posture;Muscle strength  -CP     Assessment Comments Pt is making steady progress towards her functional goals. She demonstrated functional cervical and bilateral shoulder AROM. Significant improvements noted on both NDI score and  "QuickDash score. Pt indep with current HEP and progression for home.   -CP     Please refer to paper survey for additional self-reported information Yes  -CP     Rehab Potential Good  -CP     Patient/caregiver participated in establishment of treatment plan and goals Yes  -CP     Patient would benefit from skilled therapy intervention Yes  -CP        PT Plan    PT Frequency 1x/week  -CP     Predicted Duration of Therapy Intervention (Therapy Eval) 1-2 visits  -CP     Planned CPT's? PT EVAL LOW COMPLEXITY: 82503;PT THER PROC EA 15 MIN: 53389;PT RE-EVAL: 89701;PT MANUAL THERAPY EA 15 MIN: 21008;PT ELECTRICAL STIM UNATTEND: ;PT ULTRASOUND EA 15 MIN: 26634;PT HOT/COLD PACK WC NONMCARE: 74103  -CP     PT Plan Comments Pt's chart will be placed on hold for 2 weeks, increased emphasis on indep of HEP. Anticipate d/c to home with progressed HEP by Oct 2nd if lack of f/u required.   -CP       User Key  (r) = Recorded By, (t) = Taken By, (c) = Cosigned By    Initials Name Provider Type    CP Perkins, Corinne E, PT Physical Therapist                  Exercises     Row Name 09/18/18 0800             Subjective Comments    Subjective Comments Pt states she f/u with her PCP, noted improvements, and states she is going to start \"working out with -pk\" who is a . She states no headaches and denies pain on arrival.   -CP         Subjective Pain    Able to rate subjective pain? yes  -CP      Pre-Treatment Pain Level 0  -CP      Post-Treatment Pain Level 0  -CP         Total Minutes    40906 - PT Therapeutic Exercise Minutes 31  -CP         Exercise 1    Exercise Name 1 Reassessment completed this date in clinic. Reviewed current HEP with theraband and light DB weights, pt educated on progression for continued strengthening.   -CP        User Key  (r) = Recorded By, (t) = Taken By, (c) = Cosigned By    Initials Name Provider Type    CP Perkins, Corinne E, PT Physical Therapist                    "     Outcome Measure Options: Neck Disability Index (NDI), Quick DASH  Quick DASH  Open a tight or new jar.: No Difficulty  Do heavy household chores (e.g., wash walls, wash floors): Mild Difficulty  Carry a shopping bag or briefcase: No Difficulty  Wash your back: Mild Difficulty  Use a knife to cut food: No Difficulty  Recreational activities in which you take some force or impact through your arm, should or hand (e.g. golf, hammering, tennis, etc.): No Difficulty  During the past week, to what extent has your arm, shoulder, or hand problem interfered with your normal social activites with family, friends, neighbors or groups?: Not at all  During the past week, were you limited in your work or other regular daily activities as a result of your arm, shoulder or hand problem?: Not limited at all  Arm, Shoulder, or hand pain: Mild  Tingling (pins and needles) in your arm, shoulder, or hand: None  During the past week, how much difficulty have you had sleeping because of the pain in your arm, shoulder or hand?: Mild Difficulty  Number of Questions Answered: 11  Quick DASH Score: 9.09  Neck Disability Index  Section 1 - Pain Intensity: The pain is very mild at the moment.  Section 2 - Personal Care: I can look after myself normally without causing extra pain.  Section 3 - Lifting: I can lift heavy weights without causing extra pain  Section 4 - Work: I can do as much work as I want.  Section 5 - Headaches: I have no headaches at all.  Section 6 - Concentration: I can concentrate fully with slight difficulty.  Section 7 - Sleeping: My sleep is slightly disturbed for less than 1 hour.  Section 8 - Driving: I can drive as long as I want with slight neck pain.  Section 9 - Reading: I can read as much as I want with slight neck pain.  Section 10 - Recreation: I have no neck pain during all recreational activities.  Neck Disability Index Score: 5      Time Calculation:     Therapy Suggested Charges     Code   Minutes Charges     91451 (CPT®) Hc Pt Neuromusc Re Education Ea 15 Min      71516 (CPT®) Hc Pt Ther Proc Ea 15 Min 31 2    09036 (CPT®) Hc Gait Training Ea 15 Min      43087 (CPT®) Hc Pt Therapeutic Act Ea 15 Min      59608 (CPT®) Hc Pt Manual Therapy Ea 15 Min      05513 (CPT®) Hc Pt Ther Massage- Per 15 Min      55908 (CPT®) Hc Pt Iontophoresis Ea 15 Min      89963 (CPT®) Hc Pt Elec Stim Ea-Per 15 Min      64764 (CPT®) Hc Pt Ultrasound Ea 15 Min      66932 (CPT®) Hc Pt Self Care/Mgmt/Train Ea 15 Min      89871 (CPT®) Hc Pt Prosthetic (S) Train Initial Encounter, Each 15 Min      89755 (CPT®) Hc Orthotic(S) Mgmt/Train Initial Encounter, Each 15min      66008 (CPT®) Hc Pt Aquatic Therapy Ea 15 Min      47663 (CPT®) Hc Pt Orthotic(S)/Prosthetic(S) Encounter, Each 15 Min      Total  31 2          Start Time: 0804  Total Timed Code Minutes- PT: 31 minute(s)     Therapy Charges for Today     Code Description Service Date Service Provider Modifiers Qty    56286758098 HC PT THER PROC EA 15 MIN 9/18/2018 Perkins, Corinne E, PT GP 2          PT G-Codes  Outcome Measure Options: Neck Disability Index (NDI), Quick DASH  Quick DASH Score: 9.09  Neck Disability Index Score: 5         Corinne E. Perkins, PT  9/18/2018

## 2018-10-11 ENCOUNTER — DOCUMENTATION (OUTPATIENT)
Dept: PHYSICAL THERAPY | Facility: HOSPITAL | Age: 58
End: 2018-10-11

## 2018-10-11 DIAGNOSIS — M54.2 NECK PAIN: Primary | ICD-10-CM

## 2018-10-11 NOTE — THERAPY DISCHARGE NOTE
Outpatient Physical Therapy Discharge Summary         Patient Name: Анна Turpin  : 1960  MRN: [id number]    Today's Date: 10/11/2018    Visit Dx:    ICD-10-CM ICD-9-CM   1. Neck pain M54.2 723.1             PT OP Goals     Row Name 10/11/18 0900          PT Short Term Goals    STG Date to Achieve 18  -CP     STG 1 Cervical pain is reduced by at least 25%.  -CP     STG 1 Progress Met  -CP     STG 2 Patient reports of reduced frequency or intensity of headaches by at least 50%.  -CP     STG 2 Progress Met  -CP     STG 3 Patient will be instructed in home exercise program for improved functional mobility and stabilization of the spine.  -CP     STG 3 Progress Met  -CP        Long Term Goals    LTG Date to Achieve 09/15/18  -CP     LTG 1 NDI score is improved by at least 10%.  -CP     LTG 1 Progress Met  -CP     LTG 2 Patient is independent with long term HEP for improved postural awareness and stabilization.  -CP     LTG 2 Progress Met  -CP     LTG 3 Patient will return to previous functional status for ADLs/ vocational/recreational/sports activities as identified by patient, including increased walking and lifting 5lb weights for exercise routine.   -CP     LTG 3 Progress Met  -CP       User Key  (r) = Recorded By, (t) = Taken By, (c) = Cosigned By    Initials Name Provider Type    CP Perkins, Corinne E, PT Physical Therapist          OP PT Discharge Summary  Date of Discharge: 10/11/18  Reason for Discharge: All goals achieved  Outcomes Achieved: Able to achieve all goals within established timeline  Discharge Destination: Home with home program  Discharge Instructions/Additional Comments: Patient was seen for PT IE and 12 treatments. She met all functional goals, indep with HEP. Pt will be d/c to home with HEP.       Time Calculation:        Therapy Suggested Charges     Code   Minutes Charges    None                       Corinne E. Perkins, LUIS  10/11/2018

## 2018-11-05 ENCOUNTER — OFFICE VISIT (OUTPATIENT)
Dept: INTERNAL MEDICINE | Facility: CLINIC | Age: 58
End: 2018-11-05

## 2018-11-05 VITALS
SYSTOLIC BLOOD PRESSURE: 118 MMHG | TEMPERATURE: 98.5 F | HEIGHT: 62 IN | WEIGHT: 146 LBS | HEART RATE: 80 BPM | DIASTOLIC BLOOD PRESSURE: 74 MMHG | RESPIRATION RATE: 16 BRPM | BODY MASS INDEX: 26.87 KG/M2

## 2018-11-05 DIAGNOSIS — Z00.00 PHYSICAL EXAM: Primary | ICD-10-CM

## 2018-11-05 LAB
25(OH)D3 SERPL-MCNC: 21 NG/ML
ALBUMIN SERPL-MCNC: 4.46 G/DL (ref 3.2–4.8)
ALBUMIN/GLOB SERPL: 2.3 G/DL (ref 1.5–2.5)
ALP SERPL-CCNC: 117 U/L (ref 25–100)
ALT SERPL W P-5'-P-CCNC: 12 U/L (ref 7–40)
ANION GAP SERPL CALCULATED.3IONS-SCNC: 7 MMOL/L (ref 3–11)
ARTICHOKE IGE QN: 127 MG/DL (ref 0–130)
AST SERPL-CCNC: 18 U/L (ref 0–33)
BASOPHILS # BLD AUTO: 0.02 10*3/MM3 (ref 0–0.2)
BASOPHILS NFR BLD AUTO: 0.4 % (ref 0–1)
BILIRUB SERPL-MCNC: 0.5 MG/DL (ref 0.3–1.2)
BUN BLD-MCNC: 17 MG/DL (ref 9–23)
BUN/CREAT SERPL: 23.3 (ref 7–25)
CALCIUM SPEC-SCNC: 9.1 MG/DL (ref 8.7–10.4)
CHLORIDE SERPL-SCNC: 107 MMOL/L (ref 99–109)
CHOLEST SERPL-MCNC: 198 MG/DL (ref 0–200)
CO2 SERPL-SCNC: 28 MMOL/L (ref 20–31)
CREAT BLD-MCNC: 0.73 MG/DL (ref 0.6–1.3)
DEPRECATED RDW RBC AUTO: 44.6 FL (ref 37–54)
EOSINOPHIL # BLD AUTO: 0.26 10*3/MM3 (ref 0–0.3)
EOSINOPHIL NFR BLD AUTO: 4.8 % (ref 0–3)
ERYTHROCYTE [DISTWIDTH] IN BLOOD BY AUTOMATED COUNT: 13.1 % (ref 11.3–14.5)
GFR SERPL CREATININE-BSD FRML MDRD: 82 ML/MIN/1.73
GLOBULIN UR ELPH-MCNC: 1.9 GM/DL
GLUCOSE BLD-MCNC: 93 MG/DL (ref 70–100)
HCT VFR BLD AUTO: 44 % (ref 34.5–44)
HDLC SERPL-MCNC: 69 MG/DL (ref 40–60)
HGB BLD-MCNC: 14.5 G/DL (ref 11.5–15.5)
IMM GRANULOCYTES # BLD: 0 10*3/MM3 (ref 0–0.03)
IMM GRANULOCYTES NFR BLD: 0 % (ref 0–0.6)
LYMPHOCYTES # BLD AUTO: 1.43 10*3/MM3 (ref 0.6–4.8)
LYMPHOCYTES NFR BLD AUTO: 26.2 % (ref 24–44)
MCH RBC QN AUTO: 30.7 PG (ref 27–31)
MCHC RBC AUTO-ENTMCNC: 33 G/DL (ref 32–36)
MCV RBC AUTO: 93.2 FL (ref 80–99)
MONOCYTES # BLD AUTO: 0.48 10*3/MM3 (ref 0–1)
MONOCYTES NFR BLD AUTO: 8.8 % (ref 0–12)
NEUTROPHILS # BLD AUTO: 3.26 10*3/MM3 (ref 1.5–8.3)
NEUTROPHILS NFR BLD AUTO: 59.8 % (ref 41–71)
PLATELET # BLD AUTO: 245 10*3/MM3 (ref 150–450)
PMV BLD AUTO: 10.5 FL (ref 6–12)
POTASSIUM BLD-SCNC: 4.3 MMOL/L (ref 3.5–5.5)
PROT SERPL-MCNC: 6.4 G/DL (ref 5.7–8.2)
RBC # BLD AUTO: 4.72 10*6/MM3 (ref 3.89–5.14)
SODIUM BLD-SCNC: 142 MMOL/L (ref 132–146)
TRIGL SERPL-MCNC: 78 MG/DL (ref 0–150)
TSH SERPL DL<=0.05 MIU/L-ACNC: 1.12 MIU/ML (ref 0.35–5.35)
WBC NRBC COR # BLD: 5.45 10*3/MM3 (ref 3.5–10.8)

## 2018-11-05 PROCEDURE — 99396 PREV VISIT EST AGE 40-64: CPT | Performed by: INTERNAL MEDICINE

## 2018-11-05 PROCEDURE — 80053 COMPREHEN METABOLIC PANEL: CPT | Performed by: INTERNAL MEDICINE

## 2018-11-05 PROCEDURE — 84443 ASSAY THYROID STIM HORMONE: CPT | Performed by: INTERNAL MEDICINE

## 2018-11-05 PROCEDURE — 85025 COMPLETE CBC W/AUTO DIFF WBC: CPT | Performed by: INTERNAL MEDICINE

## 2018-11-05 PROCEDURE — 36415 COLL VENOUS BLD VENIPUNCTURE: CPT | Performed by: INTERNAL MEDICINE

## 2018-11-05 PROCEDURE — 80061 LIPID PANEL: CPT | Performed by: INTERNAL MEDICINE

## 2018-11-05 PROCEDURE — 82306 VITAMIN D 25 HYDROXY: CPT | Performed by: INTERNAL MEDICINE

## 2018-11-05 NOTE — PROGRESS NOTES
Chief Complaint   Patient presents with   • Annual Exam       History of Present Illness    The patient presents for an established patient physical examination and health maintenance visit.    Review of Systems    GENERAL/CONSTITUTIONAL- Denies Unexplained Weight Loss, Fever, Chills, Sweats, Fatigue, Weakness or Malaise.    HEENT- Denies Eye Pain, Eye Drainage, Nasal Discharge, Sore Throat, Ear Pain, Ear Drainage, Headache, Decreased Vision, Sinus Pain, Nasal Congestion, Decreased Hearing, Visual Disturbance, Diplopia or Tinnitus.    NECK- Denies Decreased Range of Motion, Stiffness, Thyroid Nodules, Enlarged Lymph Nodes or Goiter.    CARDIOVASCULAR- Denies Chest Pain, Claudication, Edema, Syncope or Palpitations.    PULMONARY- Denies Wheezing, Dyspnea, Sputum Production, Cough, Hemoptysis or Pleuritic Chest Pain.    GASTROINTESTINAL- Denies Abdominal Pain, Nausea, Vomiting, Diarrhea, Blood per Rectum, Constipation or Heartburn.    GENITOURINARY- Denies Dysuria, Hematuria, Urinary Frequency, Urinary Leakage, Pelvic Pain, Vaginal Prolapse, Vaginal Discharge, Dyspareunia or Abnormal Menses.    ENDOCRINE- Denies Cold Intolerance, Depression, Hair Loss, Heat Intolerance, Memory Loss, Polydypsia, Polyphagia, Polyuria, Sleep Disturbance or Weight Gain.    NEUROLOGIC- Denies Seizures, Visual Changes, Confusion or Excessive Daytime Sleepiness.    MUSCULOSKELETAL- Denies Joint Pain, Joint Stiffness, Decreased Range of Motion, Joint Swelling or Erythema of Joints.    INTEGUMENTARY- Denies Rash, Lumps, Sores, Itching, Dryness, Color Change, Changes in Hair, Brittle Nails, Discolored Nails, Thickened Nails, Growths or Alopecia.    Medications      Current Outpatient Prescriptions:   •  conjugated estrogens (PREMARIN) 0.625 MG/GM vaginal cream, Insert 1 g into the vagina 2 (Two) Times a Week., Disp: , Rfl:   •  diphenhydrAMINE (BENADRYL) 25 mg, Take 1 capsule by mouth At Night As Needed., Disp: , Rfl:     "  Allergies    Allergies   Allergen Reactions   • Sulfa Antibiotics Anaphylaxis   • Banana GI Intolerance   • Blueberry [Vaccinium Angustifolium] Other (See Comments)     PER ALLERGY TESTING   • Citrus GI Intolerance   • Dairy Aid [Lactase]    • Eggs Or Egg-Derived Products Diarrhea   • Pineapple GI Intolerance   • Wheat Bran    • Cephalosporins Rash       Problem List    Patient Active Problem List   Diagnosis   • Anxiety   • Oral thrush       Medications, Allergies, Problems List and Past History were reviewed and updated.    Physical Examination    /74 (BP Location: Left arm, Patient Position: Sitting, Cuff Size: Adult)   Pulse 80   Temp 98.5 °F (36.9 °C) (Temporal Artery )   Resp 16   Ht 158.1 cm (62.25\")   Wt 66.2 kg (146 lb)   LMP  (LMP Unknown) Comment: LAST PAP 2017 BY DR GROVER  Breastfeeding? No   BMI 26.49 kg/m²     HEENT: Head- Normocephalic Atraumatic. Facies- Within normal limits. Pinnas- Normal texture and shape bilaterally. Canals- Normal bilaterally. TMs- Normal bilaterally. Nares- Patent bilaterally. Nasal Septum- is normal. There is no tenderness to palpation over the frontal or maxillary sinuses. Lids- Normal bilaterally. Conjunctiva- Clear bilaterally. Sclera- Anicteric bilaterally. Oropharynx- Moist with no lesions. Tonsils- No enlargement, erythema or exudate.    Neck: Thyroid- non enlarged, symmetric and has no nodules. No bruits are detected. ROM- Normal Range of Motion with no rigidity.    Lymph Nodes: Cervical- no enlarged lymph nodes noted. Clavicular- Deferred. Axillary- Deferred. Inguinal- Deferred.    Lungs: Auscultation- Clear to auscultation bilaterally. There are no retractions, clubbing or cyanosis. The Expiratory to Inspiratory ratio is equal.    Cardiovascular: There are no carotid bruits. Heart- Normal Rate with Regular rhythm and no murmurs. There are no gallops. There are no rubs. In the lower extremities there is no edema. The upper extremities do not have " edema.    Abdomen: Soft, benign, non-tender with no masses, hernias, organomegaly or scars.    Breast: Normal contours bilaterally with no masses, discharge, skin changes or lumps. There are no scars noted.    The patient has no worrisome or suspicious skin lesions noted.    Impression and Assessment    Normal Physical Examination.    Plan    Counseling was provided regarding: Adequate Aerobic Exercise, Dental Visits, Flossing Teeth, Heart Healthy Diet and Seat Belt Utilization.    The following was ordered for screening and health maintenance: CBC w Automated Diff, CMP, Lipid Profile, Screening Mammogram, TSH, U/A and Vit D.       Immunizations Ordered and Administered: None.    Анна was seen today for annual exam.    Diagnoses and all orders for this visit:    Physical exam  -     Comprehensive Metabolic Panel  -     Lipid Panel  -     CBC & Differential  -     TSH  -     Vitamin D 25 Hydroxy  -     Mammo Screening Digital Tomosynthesis Bilateral With CAD; Future        Return to Office    The patient was instructed to return for follow-up in 1 year. Next Visit: Physical.    The patient was instructed to return sooner if the condition changes, worsens, or doesn't resolve.

## 2019-02-13 ENCOUNTER — TELEPHONE (OUTPATIENT)
Dept: INTERNAL MEDICINE | Facility: CLINIC | Age: 59
End: 2019-02-13

## 2019-02-13 NOTE — TELEPHONE ENCOUNTER
----- Message from Gaby Lewis sent at 2/13/2019  2:39 PM EST -----  LVM for pt to see if she is ready to get this scheduled and to get days/times that are good for her      ----- Message -----  From: Courtney Leach  Sent: 12/28/2018  11:00 AM  To: Gaby Lewis    12-28-18  I called  patient did not have her mammogram done November 16th. Can you see about getting her scheduled at  like patient requested.  Thanks!!    ----- Message -----  From: SYSTEM  Sent: 12/11/2018  12:10 AM  To: Janine Schmidt [facility]

## 2019-04-16 ENCOUNTER — OFFICE VISIT (OUTPATIENT)
Dept: INTERNAL MEDICINE | Facility: CLINIC | Age: 59
End: 2019-04-16

## 2019-04-16 ENCOUNTER — TELEPHONE (OUTPATIENT)
Dept: INTERNAL MEDICINE | Facility: CLINIC | Age: 59
End: 2019-04-16

## 2019-04-16 VITALS
TEMPERATURE: 98 F | DIASTOLIC BLOOD PRESSURE: 74 MMHG | BODY MASS INDEX: 25.76 KG/M2 | WEIGHT: 142 LBS | HEART RATE: 84 BPM | SYSTOLIC BLOOD PRESSURE: 118 MMHG | RESPIRATION RATE: 16 BRPM

## 2019-04-16 DIAGNOSIS — J30.9 ALLERGIC RHINITIS, UNSPECIFIED SEASONALITY, UNSPECIFIED TRIGGER: Primary | ICD-10-CM

## 2019-04-16 DIAGNOSIS — R51.9 NONINTRACTABLE HEADACHE, UNSPECIFIED CHRONICITY PATTERN, UNSPECIFIED HEADACHE TYPE: ICD-10-CM

## 2019-04-16 PROCEDURE — 99214 OFFICE O/P EST MOD 30 MIN: CPT | Performed by: INTERNAL MEDICINE

## 2019-04-16 RX ORDER — CIPROFLOXACIN 500 MG/1
500 TABLET, FILM COATED ORAL 2 TIMES DAILY
Qty: 14 TABLET | Refills: 0 | Status: SHIPPED | OUTPATIENT
Start: 2019-04-16 | End: 2019-04-23

## 2019-04-16 RX ORDER — MOMETASONE FUROATE 50 UG/1
2 SPRAY, METERED NASAL DAILY
Qty: 1 EACH | Refills: 12 | Status: SHIPPED | OUTPATIENT
Start: 2019-04-16 | End: 2020-06-29

## 2019-04-16 RX ORDER — METHYLPREDNISOLONE 4 MG/1
TABLET ORAL
Qty: 21 TABLET | Refills: 0 | Status: SHIPPED | OUTPATIENT
Start: 2019-04-16 | End: 2019-06-14

## 2019-04-16 NOTE — PROGRESS NOTES
Chief Complaint   Patient presents with   • Headache       History of Present Illness      The patient presents with a 1 month history of allergy symptoms. The symptoms are seasonal and are worse in the spring. The patient reports facial pain, a headache, nasal congestion, a runny nose and itchy watery eyes but the patient hasn't noted a dry cough, a wet cough, wheezing, fever, eye drainage, ear pain, ear drainage, a rash, nausea, vomiting, diarrhea, chills, decreased appetite, sneezing or eczema. She has not tried anything to relieve the symptoms.     The patient presents for evaluation of headaches which have been present for one month. The headaches have been continuous and unrelenting. The headaches are not associated with nausea and vomiting. There are no associated aura symptoms. The headache pain is described as mild. The pain is located in the frontal area. The pain is not associated with neck stiffness, focal neurologic deficits, photophobia, phonophobia or visual changes. She has not taken prophylactic medications for her headaches. The headaches do not awaken the patient from sleep, occur when the patient arises in the morning or cause personality changes.    Review of Systems    CONSTITUTIONAL- Denies Unexplained Weight Loss, Sweats, Fatigue, Weakness or Malaise.    HENT- Denies Sore Throat, Sinus Pain or Decreased Hearing.    GASTROINTESTINAL- Denies Abdominal Pain, Blood per Rectum, Constipation or Heartburn.    PULMONARY- Denies Dyspnea, Sputum Production, Cough, Hemoptysis or Pleuritic Chest Pain.    CARDIOVASCULAR- Denies Chest Pain, Claudication, Edema, Syncope, Palpitations or Irregular Heart Beat.    Medications      Current Outpatient Medications:   •  conjugated estrogens (PREMARIN) 0.625 MG/GM vaginal cream, Insert 1 g into the vagina 2 (Two) Times a Week., Disp: , Rfl:   •  diphenhydrAMINE (BENADRYL) 25 mg, Take 1 capsule by mouth At Night As Needed., Disp: , Rfl:   •   Loratadine-Pseudoephedrine (CLARITIN-D 24 HOUR PO), Take 1 tablet by mouth Daily As Needed., Disp: , Rfl:   •  methylPREDNISolone (MEDROL, LEFTY,) 4 MG tablet, Take as directed on package instructions., Disp: 21 tablet, Rfl: 0  •  mometasone (NASONEX) 50 MCG/ACT nasal spray, 2 sprays into the nostril(s) as directed by provider Daily., Disp: 1 each, Rfl: 12     Allergies    Allergies   Allergen Reactions   • Sulfa Antibiotics Anaphylaxis   • Banana GI Intolerance   • Blueberry [Vaccinium Angustifolium] Other (See Comments)     PER ALLERGY TESTING   • Citrus GI Intolerance   • Dairy Aid [Lactase]    • Eggs Or Egg-Derived Products Diarrhea   • Pineapple GI Intolerance   • Wheat Bran    • Cephalosporins Rash       Problem List    Patient Active Problem List   Diagnosis   • Anxiety   • Oral thrush       Medications, Allergies, Problems List and Past History were reviewed and updated.    Physical Examination    /74 (BP Location: Right arm, Patient Position: Sitting, Cuff Size: Adult)   Pulse 84   Temp 98 °F (36.7 °C) (Temporal)   Resp 16   Wt 64.4 kg (142 lb)   LMP  (LMP Unknown) Comment: LAST PAP 2017 BY DR GROVER  Breastfeeding? No   BMI 25.76 kg/m²     HEENT: Head- Normocephalic Atraumatic. Facies- Within normal limits. Pinnas- Normal texture and shape bilaterally. Canals- Normal bilaterally. TMs- Normal bilaterally. Nares- Both nares are abnormal. The right Nare is inflammed, is boggy and has a bluish discoloration. The left Nare is inflammed, is boggy and has a bluish discoloration. Nasal Septum- is normal. There is no tenderness to palpation over the frontal or maxillary sinuses. Lids- Normal bilaterally. Conjunctiva- Clear bilaterally. Sclera- Anicteric bilaterally. Oropharynx- Moist with no lesions. Tonsils- No enlargement, erythema or exudate.    Neck: Thyroid- non enlarged, symmetric and has no nodules. No bruits are detected. ROM- Normal Range of Motion with no rigidity.    Lungs: Auscultation- Clear  to auscultation bilaterally. There are no retractions, clubbing or cyanosis. The Expiratory to Inspiratory ratio is equal.    Cardiovascular: There are no carotid bruits. Heart- Normal Rate with Regular rhythm and no murmurs. There are no gallops. There are no rubs. In the lower extremities there is no edema. The upper extremities do not have edema.    Abdomen: Soft, benign, non-tender with no masses, hernias, organomegaly or scars.    Neurologic Exam:    Cranial Nerves II-XII: Intact    Upper Extremity Neuro Exam: Muscle Strength is 5/5 in all major upper extremity muscle groups. Deep Tendon Reflexes are 2+ and equal in the biceps, triceps and brachioradialis distributions bilaterally. Sensation is normal in all distributions.    Lower Extremity Neuro Exam: Muscle Strength is 5/5 in all major lower extremity muscle groups. Deep Tendon Reflexes are 2+ and equal in the patellar and Achilles distributions bilaterally. Sensation is normal in all distributions.    Gait: Normal.    Cerebellar Function: No abnormalities noted with finger to nose on the right or finger to nose on the left.    Impression and Assessment    Allergic Rhinitis.    Headache.    Plan    Allergic Rhinitis Plan: Medication was added as noted below.    Headache Plan: Medication was added as noted below.    Анна was seen today for headache.    Diagnoses and all orders for this visit:    Allergic rhinitis, unspecified seasonality, unspecified trigger  -     methylPREDNISolone (MEDROL, LEFTY,) 4 MG tablet; Take as directed on package instructions.  -     mometasone (NASONEX) 50 MCG/ACT nasal spray; 2 sprays into the nostril(s) as directed by provider Daily.    Nonintractable headache, unspecified chronicity pattern, unspecified headache type  -     methylPREDNISolone (MEDROL, LEFTY,) 4 MG tablet; Take as directed on package instructions.  -     mometasone (NASONEX) 50 MCG/ACT nasal spray; 2 sprays into the nostril(s) as directed by provider  Daily.        Return to Office    The patient was instructed to return for follow-up at the next scheduled visit.    The patient was instructed to return sooner if the condition changes, worsens, or does not resolve.

## 2019-04-16 NOTE — PATIENT INSTRUCTIONS
"Heart-Healthy Eating Plan  Heart-healthy meal planning includes:  · Limiting unhealthy fats.  · Increasing healthy fats.  · Making other small dietary changes.    You may need to talk with your doctor or a diet specialist (dietitian) to create an eating plan that is right for you.  What types of fat should I choose?  · Choose healthy fats. These include olive oil and canola oil, flaxseeds, walnuts, almonds, and seeds.  · Eat more omega-3 fats. These include salmon, mackerel, sardines, tuna, flaxseed oil, and ground flaxseeds. Try to eat fish at least twice each week.  · Limit saturated fats.  ? Saturated fats are often found in animal products, such as meats, butter, and cream.  ? Plant sources of saturated fats include palm oil, palm kernel oil, and coconut oil.  · Avoid foods with partially hydrogenated oils in them. These include stick margarine, some tub margarines, cookies, crackers, and other baked goods. These contain trans fats.  What general guidelines do I need to follow?  · Check food labels carefully. Identify foods with trans fats or high amounts of saturated fat.  · Fill one half of your plate with vegetables and green salads. Eat 4-5 servings of vegetables per day. A serving of vegetables is:  ? 1 cup of raw leafy vegetables.  ? ½ cup of raw or cooked cut-up vegetables.  ? ½ cup of vegetable juice.  · Fill one fourth of your plate with whole grains. Look for the word \"whole\" as the first word in the ingredient list.  · Fill one fourth of your plate with lean protein foods.  · Eat 4-5 servings of fruit per day. A serving of fruit is:  ? One medium whole fruit.  ? ¼ cup of dried fruit.  ? ½ cup of fresh, frozen, or canned fruit.  ? ½ cup of 100% fruit juice.  · Eat more foods that contain soluble fiber. These include apples, broccoli, carrots, beans, peas, and barley. Try to get 20-30 g of fiber per day.  · Eat more home-cooked food. Eat less restaurant, buffet, and fast food.  · Limit or avoid " alcohol.  · Limit foods high in starch and sugar.  · Avoid fried foods.  · Avoid frying your food. Try baking, boiling, grilling, or broiling it instead. You can also reduce fat by:  ? Removing the skin from poultry.  ? Removing all visible fats from meats.  ? Skimming the fat off of stews, soups, and gravies before serving them.  ? Steaming vegetables in water or broth.  · Lose weight if you are overweight.  · Eat 4-5 servings of nuts, legumes, and seeds per week:  ? One serving of dried beans or legumes equals ½ cup after being cooked.  ? One serving of nuts equals 1½ ounces.  ? One serving of seeds equals ½ ounce or one tablespoon.  · You may need to keep track of how much salt or sodium you eat. This is especially true if you have high blood pressure. Talk with your doctor or dietitian to get more information.  What foods can I eat?  Grains  Breads, including Moroccan, white, shivam, wheat, raisin, rye, oatmeal, and Italian. Tortillas that are neither fried nor made with lard or trans fat. Low-fat rolls, including hotdog and hamburger buns and English muffins. Biscuits. Muffins. Waffles. Pancakes. Light popcorn. Whole-grain cereals. Flatbread. [place] toast. Pretzels. Breadsticks. Rusks. Low-fat snacks. Low-fat crackers, including oyster, saltine, matzo, shadia, animal, and rye. Rice and pasta, including brown rice and pastas that are made with whole wheat.  Vegetables  All vegetables.  Fruits  All fruits, but limit coconut.  Meats and Other Protein Sources  Lean, well-trimmed beef, veal, pork, and lamb. Chicken and turkey without skin. All fish and shellfish. Wild duck, rabbit, pheasant, and venison. Egg whites or low-cholesterol egg substitutes. Dried beans, peas, lentils, and tofu. Seeds and most nuts.  Dairy  Low-fat or nonfat cheeses, including ricotta, string, and mozzarella. Skim or 1% milk that is liquid, powdered, or evaporated. Buttermilk that is made with low-fat milk. Nonfat or low-fat  yogurt.  Beverages  Mineral water. Diet carbonated beverages.  Sweets and Desserts  Sherbets and fruit ices. Honey, jam, marmalade, jelly, and syrups. Meringues and gelatins. Pure sugar candy, such as hard candy, jelly beans, gumdrops, mints, marshmallows, and small amounts of dark chocolate. Farzad food cake.  Eat all sweets and desserts in moderation.  Fats and Oils  Nonhydrogenated (trans-free) margarines. Vegetable oils, including soybean, sesame, sunflower, olive, peanut, safflower, corn, canola, and cottonseed. Salad dressings or mayonnaise made with a vegetable oil. Limit added fats and oils that you use for cooking, baking, salads, and as spreads.  Other  Cocoa powder. Coffee and tea. All seasonings and condiments.  The items listed above may not be a complete list of recommended foods or beverages. Contact your dietitian for more options.  What foods are not recommended?  Grains  Breads that are made with saturated or trans fats, oils, or whole milk. Croissants. Butter rolls. Cheese breads. Sweet rolls. Donuts. Buttered popcorn. Chow mein noodles. High-fat crackers, such as cheese or butter crackers.  Meats and Other Protein Sources  Fatty meats, such as hotdogs, short ribs, sausage, spareribs, aiken, rib eye roast or steak, and mutton. High-fat deli meats, such as salami and bologna. Caviar. Domestic duck and goose. Organ meats, such as kidney, liver, sweetbreads, and heart.  Dairy  Cream, sour cream, cream cheese, and creamed cottage cheese. Whole-milk cheeses, including blue (kiara), [place] John, Brie, Eris, American, Havarti, Swiss, cheddar, Camembert, and [place]. Whole or 2% milk that is liquid, evaporated, or condensed. Whole buttermilk. Cream sauce or high-fat cheese sauce. Yogurt that is made from whole milk.  Beverages  Regular sodas and juice drinks with added sugar.  Sweets and Desserts  Frosting. Pudding. Cookies. Cakes other than farzad food cake. Candy that has milk chocolate or white  chocolate, hydrogenated fat, butter, coconut, or unknown ingredients. Buttered syrups. Full-fat ice cream or ice cream drinks.  Fats and Oils  Gravy that has suet, meat fat, or shortening. Cocoa butter, hydrogenated oils, palm oil, coconut oil, palm kernel oil. These can often be found in baked products, candy, fried foods, nondairy creamers, and whipped toppings. Solid fats and shortenings, including aiken fat, salt pork, lard, and butter. Nondairy cream substitutes, such as coffee creamers and sour cream substitutes. Salad dressings that are made of unknown oils, cheese, or sour cream.  The items listed above may not be a complete list of foods and beverages to avoid. Contact your dietitian for more information.  This information is not intended to replace advice given to you by your health care provider. Make sure you discuss any questions you have with your health care provider.  Document Released: 06/18/2013 Document Revised: 05/25/2017 Document Reviewed: 06/11/2015  [employer] Patient Education © 2019 [employer]

## 2019-04-16 NOTE — TELEPHONE ENCOUNTER
----- Message from Catalina Schuster sent at 4/16/2019 12:34 PM EDT -----  Contact: SELF  REEMA SRIVASTAVA WAS IN TODAY FOR A HEADACHE AND SAID SHE DID NOT WANT AN ANTIBIOTIC. SHE HAS CHANGED HER MIND AND WANTS TO KNOW IF SHE CAN GET ONE SENT TO [facility] ON [address] SHE CAN BE REACHED [phone]

## 2019-06-14 ENCOUNTER — HOSPITAL ENCOUNTER (OUTPATIENT)
Dept: GENERAL RADIOLOGY | Facility: HOSPITAL | Age: 59
Discharge: HOME | End: 2019-06-14
Admitting: INTERNAL MEDICINE

## 2019-06-14 ENCOUNTER — OFFICE VISIT (OUTPATIENT)
Dept: INTERNAL MEDICINE | Facility: CLINIC | Age: 59
End: 2019-06-14

## 2019-06-14 VITALS
BODY MASS INDEX: 25.22 KG/M2 | SYSTOLIC BLOOD PRESSURE: 120 MMHG | HEART RATE: 72 BPM | WEIGHT: 139 LBS | TEMPERATURE: 98.6 F | DIASTOLIC BLOOD PRESSURE: 74 MMHG

## 2019-06-14 DIAGNOSIS — M79.604 RIGHT LEG PAIN: Primary | ICD-10-CM

## 2019-06-14 DIAGNOSIS — M79.604 RIGHT LEG PAIN: ICD-10-CM

## 2019-06-14 PROCEDURE — 73590 X-RAY EXAM OF LOWER LEG: CPT

## 2019-06-14 PROCEDURE — 99213 OFFICE O/P EST LOW 20 MIN: CPT | Performed by: INTERNAL MEDICINE

## 2019-06-14 NOTE — PROGRESS NOTES
Subjective       Анна Turpin is a 59 y.o. female.     Chief Complaint   Patient presents with   • Knee Pain     Fell on Right knee and has a knot.       History obtained from the patient.      Knee Pain    Incident onset: 8 days ago. Incident location: in a store parking lot. The injury mechanism was a direct blow and a fall. The pain is present in the right knee. Quality: soreness. The pain is mild. The pain has been intermittent since onset. Pertinent negatives include no inability to bear weight, loss of motion, loss of sensation, muscle weakness, numbness or tingling. She reports no foreign bodies present. The symptoms are aggravated by palpation. She has tried ice for the symptoms. The treatment provided mild relief.      The patient did fall on concrete and had a small abrasion initially.  Last Tdap was 11/1/14.    The following portions of the patient's history were reviewed and updated as appropriate: allergies, current medications, past family history, past medical history, past social history, past surgical history and problem list.      Review of Systems   Constitutional: Negative for chills and fever.   Musculoskeletal: Positive for joint swelling. Negative for arthralgias, back pain, neck pain and neck stiffness.   Skin: Negative for rash.   Neurological: Negative for tingling and numbness.           Objective     Blood pressure 120/74, pulse 72, temperature 98.6 °F (37 °C), temperature source Temporal, weight 63 kg (139 lb), not currently breastfeeding.    Physical Exam   Constitutional: She appears well-developed and well-nourished.   Cardiovascular: Intact distal pulses.   Musculoskeletal:   There is no erythema, edema, warmth, or tenderness to palpation of the right knee.  There is no pain with flexion and extension of the right knee.  There is no right knee instability.  There is an area of soft tissue swelling just below the right knee anteriorly.  This is tender to palpation.  There is some  bruising in the area.   Neurological: She is alert. She has normal strength and normal reflexes.   Lower extremity only examined.   Skin: No rash noted.   Psychiatric: She has a normal mood and affect.   Nursing note and vitals reviewed.        Assessment/Plan   Анна was seen today for knee pain.    Diagnoses and all orders for this visit:    Right leg pain  -     XR Tibia Fibula 2 View Right; Future     Recommend ice 2-3 times daily, and Ibuprofen as needed.    Return if symptoms worsen or fail to improve.

## 2019-08-08 ENCOUNTER — OFFICE VISIT (OUTPATIENT)
Dept: INTERNAL MEDICINE | Facility: CLINIC | Age: 59
End: 2019-08-08

## 2019-08-08 VITALS
RESPIRATION RATE: 16 BRPM | BODY MASS INDEX: 25.4 KG/M2 | DIASTOLIC BLOOD PRESSURE: 68 MMHG | TEMPERATURE: 98.4 F | WEIGHT: 140 LBS | SYSTOLIC BLOOD PRESSURE: 118 MMHG | HEART RATE: 84 BPM

## 2019-08-08 DIAGNOSIS — J30.9 ALLERGIC RHINITIS, UNSPECIFIED SEASONALITY, UNSPECIFIED TRIGGER: Primary | ICD-10-CM

## 2019-08-08 PROCEDURE — 99213 OFFICE O/P EST LOW 20 MIN: CPT | Performed by: INTERNAL MEDICINE

## 2019-08-08 PROCEDURE — 96372 THER/PROPH/DIAG INJ SC/IM: CPT | Performed by: INTERNAL MEDICINE

## 2019-08-08 RX ORDER — DEXAMETHASONE SODIUM PHOSPHATE 4 MG/ML
12 INJECTION, SOLUTION INTRA-ARTICULAR; INTRALESIONAL; INTRAMUSCULAR; INTRAVENOUS; SOFT TISSUE ONCE
Status: COMPLETED | OUTPATIENT
Start: 2019-08-08 | End: 2019-08-08

## 2019-08-08 RX ADMIN — DEXAMETHASONE SODIUM PHOSPHATE 12 MG: 4 INJECTION, SOLUTION INTRA-ARTICULAR; INTRALESIONAL; INTRAMUSCULAR; INTRAVENOUS; SOFT TISSUE at 12:15

## 2019-08-08 NOTE — PROGRESS NOTES
Chief Complaint   Patient presents with   • Allergies       History of Present Illness    The patient presents with a 2 month history of allergy symptoms. The symptoms are seasonal and are worse in the fall. The patient reports facial pain, a headache, ear pain, nasal congestion, a runny nose, itchy watery eyes and sneezing but the patient hasn't noted a dry cough, a wet cough, wheezing, fever, eye drainage, ear drainage, a rash, nausea, vomiting, diarrhea, chills, decreased appetite or eczema. The ear pain is bilateral. She has used steroid nasal sprays and Claritin for the symptoms.   The antihistamines did not result in improvement of her allergies. The symptoms are worsened by cold air and mold.    Review of Systems    CONSTITUTIONAL- Denies Unexplained Weight Loss, Sweats, Fatigue, Weakness or Malaise.    PULMONARY- Denies Dyspnea, Sputum Production, Cough, Hemoptysis or Pleuritic Chest Pain.    GASTROINTESTINAL- Denies Abdominal Pain, Blood per Rectum, Constipation or Heartburn.    Medications      Current Outpatient Medications:   •  conjugated estrogens (PREMARIN) 0.625 MG/GM vaginal cream, Insert 1 g into the vagina 2 (Two) Times a Week., Disp: , Rfl:   •  diphenhydrAMINE (BENADRYL) 25 mg, Take 1 capsule by mouth At Night As Needed., Disp: , Rfl:   •  Loratadine-Pseudoephedrine (CLARITIN-D 24 HOUR PO), Take 1 tablet by mouth Daily As Needed., Disp: , Rfl:   •  mometasone (NASONEX) 50 MCG/ACT nasal spray, 2 sprays into the nostril(s) as directed by provider Daily., Disp: 1 each, Rfl: 12    Current Facility-Administered Medications:   •  dexamethasone (DECADRON) injection 12 mg, 12 mg, Intramuscular, Once, Oseas Mendenhall MD     Allergies    Allergies   Allergen Reactions   • Sulfa Antibiotics Anaphylaxis   • Banana GI Intolerance   • Blueberry [Vaccinium Angustifolium] Other (See Comments)     PER ALLERGY TESTING   • Citrus GI Intolerance   • Dairy Aid [Lactase]    • Eggs Or Egg-Derived Products  Diarrhea   • Pineapple GI Intolerance   • Wheat Bran    • Cephalosporins Rash       Problem List    Patient Active Problem List   Diagnosis   • Anxiety   • Oral thrush       Medications, Allergies, Problems List and Past History were reviewed and updated.    Physical Examination    /68 (BP Location: Right arm, Patient Position: Sitting, Cuff Size: Adult)   Pulse 84   Temp 98.4 °F (36.9 °C) (Temporal)   Resp 16   Wt 63.5 kg (140 lb)   LMP  (LMP Unknown) Comment: LAST PAP 2017 BY DR GROVER  Breastfeeding? No   BMI 25.40 kg/m²     HEENT: Head- Normocephalic Atraumatic. Facies- Within normal limits. Pinnas- Normal texture and shape bilaterally. Canals- Normal bilaterally. TMs- Normal bilaterally. Nares- Patent bilaterally. Nasal Septum- is normal. There is no tenderness to palpation over the frontal or maxillary sinuses. Lids- Normal bilaterally. Conjunctiva- Clear bilaterally. Sclera- Anicteric bilaterally. Oropharynx- Moist with no lesions. Tonsils- No enlargement, erythema or exudate.    Neck: Thyroid- non enlarged, symmetric and has no nodules. No bruits are detected. ROM- Normal Range of Motion with no rigidity.    Lungs: Auscultation- Clear to auscultation bilaterally. There are no retractions, clubbing or cyanosis. The Expiratory to Inspiratory ratio is equal.    Cardiovascular: There are no carotid bruits. Heart- Normal Rate with Regular rhythm and no murmurs. There are no gallops. There are no rubs. In the lower extremities there is no edema. The upper extremities do not have edema.    Impression and Assessment    Allergic Rhinitis.    Plan    Allergic Rhinitis Plan: Decadron was given. See letter to employer regarding work conditions.    Анна was seen today for allergies.    Diagnoses and all orders for this visit:    Allergic rhinitis, unspecified seasonality, unspecified trigger  -     dexamethasone (DECADRON) injection 12 mg        Return to Office    The patient was instructed to return for  follow-up at the next scheduled visit.    The patient was instructed to return sooner if the condition changes, worsens, or does not resolve.

## 2019-11-06 ENCOUNTER — OFFICE VISIT (OUTPATIENT)
Dept: INTERNAL MEDICINE | Facility: CLINIC | Age: 59
End: 2019-11-06

## 2019-11-06 VITALS
HEIGHT: 62 IN | BODY MASS INDEX: 25.58 KG/M2 | HEART RATE: 72 BPM | RESPIRATION RATE: 16 BRPM | DIASTOLIC BLOOD PRESSURE: 72 MMHG | WEIGHT: 139 LBS | SYSTOLIC BLOOD PRESSURE: 114 MMHG | TEMPERATURE: 97.4 F

## 2019-11-06 DIAGNOSIS — Z00.00 PHYSICAL EXAM: Primary | ICD-10-CM

## 2019-11-06 DIAGNOSIS — H91.93 BILATERAL HEARING LOSS, UNSPECIFIED HEARING LOSS TYPE: ICD-10-CM

## 2019-11-06 DIAGNOSIS — R82.998 LEUKOCYTES IN URINE: ICD-10-CM

## 2019-11-06 DIAGNOSIS — M54.9 ACUTE MIDLINE BACK PAIN, UNSPECIFIED BACK LOCATION: ICD-10-CM

## 2019-11-06 DIAGNOSIS — Z12.11 SCREENING FOR COLON CANCER: ICD-10-CM

## 2019-11-06 LAB
ALBUMIN SERPL-MCNC: 4.2 G/DL (ref 3.5–5.2)
ALBUMIN/GLOB SERPL: 1.4 G/DL
ALP SERPL-CCNC: 89 U/L (ref 39–117)
ALT SERPL W P-5'-P-CCNC: 12 U/L (ref 1–33)
ANION GAP SERPL CALCULATED.3IONS-SCNC: 10.5 MMOL/L (ref 5–15)
AST SERPL-CCNC: 20 U/L (ref 1–32)
BASOPHILS # BLD AUTO: 0.05 10*3/MM3 (ref 0–0.2)
BASOPHILS NFR BLD AUTO: 0.9 % (ref 0–1.5)
BILIRUB BLD-MCNC: NEGATIVE MG/DL
BILIRUB SERPL-MCNC: 0.8 MG/DL (ref 0.2–1.2)
BUN BLD-MCNC: 13 MG/DL (ref 6–20)
BUN/CREAT SERPL: 17.8 (ref 7–25)
CALCIUM SPEC-SCNC: 9.7 MG/DL (ref 8.6–10.5)
CHLORIDE SERPL-SCNC: 104 MMOL/L (ref 98–107)
CHOLEST SERPL-MCNC: 193 MG/DL (ref 0–200)
CLARITY, POC: ABNORMAL
CO2 SERPL-SCNC: 29.5 MMOL/L (ref 22–29)
COLOR UR: YELLOW
CREAT BLD-MCNC: 0.73 MG/DL (ref 0.57–1)
DEPRECATED RDW RBC AUTO: 42.1 FL (ref 37–54)
EOSINOPHIL # BLD AUTO: 0.21 10*3/MM3 (ref 0–0.4)
EOSINOPHIL NFR BLD AUTO: 3.7 % (ref 0.3–6.2)
ERYTHROCYTE [DISTWIDTH] IN BLOOD BY AUTOMATED COUNT: 12.1 % (ref 12.3–15.4)
EXPIRATION DATE: ABNORMAL
GFR SERPL CREATININE-BSD FRML MDRD: 82 ML/MIN/1.73
GLOBULIN UR ELPH-MCNC: 3.1 GM/DL
GLUCOSE BLD-MCNC: 97 MG/DL (ref 65–99)
GLUCOSE UR STRIP-MCNC: NEGATIVE MG/DL
HCT VFR BLD AUTO: 45 % (ref 34–46.6)
HDLC SERPL-MCNC: 71 MG/DL (ref 40–60)
HGB BLD-MCNC: 15.1 G/DL (ref 12–15.9)
IMM GRANULOCYTES # BLD AUTO: 0.02 10*3/MM3 (ref 0–0.05)
IMM GRANULOCYTES NFR BLD AUTO: 0.4 % (ref 0–0.5)
KETONES UR QL: ABNORMAL
LDLC SERPL CALC-MCNC: 98 MG/DL (ref 0–100)
LDLC/HDLC SERPL: 1.38 {RATIO}
LEUKOCYTE EST, POC: ABNORMAL
LYMPHOCYTES # BLD AUTO: 1.56 10*3/MM3 (ref 0.7–3.1)
LYMPHOCYTES NFR BLD AUTO: 27.3 % (ref 19.6–45.3)
Lab: ABNORMAL
MCH RBC QN AUTO: 31.2 PG (ref 26.6–33)
MCHC RBC AUTO-ENTMCNC: 33.6 G/DL (ref 31.5–35.7)
MCV RBC AUTO: 93 FL (ref 79–97)
MONOCYTES # BLD AUTO: 0.57 10*3/MM3 (ref 0.1–0.9)
MONOCYTES NFR BLD AUTO: 10 % (ref 5–12)
NEUTROPHILS # BLD AUTO: 3.3 10*3/MM3 (ref 1.7–7)
NEUTROPHILS NFR BLD AUTO: 57.7 % (ref 42.7–76)
NITRITE UR-MCNC: NEGATIVE MG/ML
NRBC BLD AUTO-RTO: 0 /100 WBC (ref 0–0.2)
PH UR: 6 [PH] (ref 5–8)
PLATELET # BLD AUTO: 236 10*3/MM3 (ref 140–450)
PMV BLD AUTO: 10.7 FL (ref 6–12)
POTASSIUM BLD-SCNC: 4.8 MMOL/L (ref 3.5–5.2)
PROT SERPL-MCNC: 7.3 G/DL (ref 6–8.5)
PROT UR STRIP-MCNC: NEGATIVE MG/DL
RBC # BLD AUTO: 4.84 10*6/MM3 (ref 3.77–5.28)
RBC # UR STRIP: NEGATIVE /UL
SODIUM BLD-SCNC: 144 MMOL/L (ref 136–145)
SP GR UR: 1.02 (ref 1–1.03)
TRIGL SERPL-MCNC: 119 MG/DL (ref 0–150)
TSH SERPL DL<=0.05 MIU/L-ACNC: 1.68 UIU/ML (ref 0.27–4.2)
UROBILINOGEN UR QL: NORMAL
VLDLC SERPL-MCNC: 23.8 MG/DL (ref 5–40)
WBC NRBC COR # BLD: 5.71 10*3/MM3 (ref 3.4–10.8)

## 2019-11-06 PROCEDURE — 81003 URINALYSIS AUTO W/O SCOPE: CPT | Performed by: INTERNAL MEDICINE

## 2019-11-06 PROCEDURE — 99396 PREV VISIT EST AGE 40-64: CPT | Performed by: INTERNAL MEDICINE

## 2019-11-06 PROCEDURE — 84443 ASSAY THYROID STIM HORMONE: CPT | Performed by: INTERNAL MEDICINE

## 2019-11-06 PROCEDURE — 87086 URINE CULTURE/COLONY COUNT: CPT | Performed by: INTERNAL MEDICINE

## 2019-11-06 PROCEDURE — 80061 LIPID PANEL: CPT | Performed by: INTERNAL MEDICINE

## 2019-11-06 PROCEDURE — 80053 COMPREHEN METABOLIC PANEL: CPT | Performed by: INTERNAL MEDICINE

## 2019-11-06 PROCEDURE — 85025 COMPLETE CBC W/AUTO DIFF WBC: CPT | Performed by: INTERNAL MEDICINE

## 2019-11-06 PROCEDURE — 36415 COLL VENOUS BLD VENIPUNCTURE: CPT | Performed by: INTERNAL MEDICINE

## 2019-11-06 NOTE — PROGRESS NOTES
Chief Complaint   Patient presents with   • Annual Exam       History of Present Illness    The patient presents for an established patient physical examination and health maintenance visit.    The patient presents for a follow-up related to back pain which has been a chronic problem. She states that the pain was first noted to come on insidiously. The pain is not associated with fever, chills, weight loss, rashes, dysuria, hematuria or a decreased urine stream. The patient denies a past history of malignancy. The pain does not radiate. There is no numbness. The patient denies loss of bladder control. The patient denies loss of bowel control. The patient has attempted no therapy to relieve the symptoms. The patient reports no aggravating factors.    She reports decreased hearing in both ears of a greater than one year duration. There is no ear pain. She denies tinnitis. She has not had exposure to loud noises throughout her life. There is no family history of hearing loss reported. There are no other associated symptoms of a dry cough, a wet cough, wheezing, facial pain, a headache, eye drainage, ear drainage, nasal congestion, nausea, vomiting, diarrhea, decreased appetite, abdominal pain, sore throat or myalgias.    Review of Systems    CONSTITUTIONAL- Denies Sweats, Fatigue, Weakness or Malaise.    NECK- Denies Decreased Range of Motion, Stiffness, Thyroid Nodules, Enlarged Lymph Nodes or Goiter.    EYES- Denies Eye Pain, Eye Redness, Eye Discharge, Decreased Vision, Visual Disturbance, Diplopia, Visual Loss or Swollen Eyelid.    HENT- Reports: Decreased Hearing. Denies: Nasal Discharge, Sinus Pain or Tinnitus.    PULMONARY- Denies Dyspnea, Sputum Production, Cough, Hemoptysis or Pleuritic Chest Pain.    GASTROINTESTINAL- Denies Blood per Rectum, Constipation or Heartburn.    CARDIOVASCULAR- Denies Chest Pain, Claudication, Edema, Syncope, Palpitations or Irregular Heart Beat.    GENITOURINARY- Denies Urinary  Frequency, Urinary Leakage, Pelvic Pain, Vaginal Prolapse, Vaginal Discharge, Dyspareunia or Abnormal Menses.    ENDOCRINE- Denies Cold Intolerance, Depression, Hair Loss, Heat Intolerance, Memory Loss, Polydypsia, Polyphagia, Polyuria, Sleep Disturbance or Weight Gain.    NEUROLOGIC- Denies Seizures, Visual Changes, Confusion or Excessive Daytime Sleepiness.    MUSCULOSKELETAL- Denies Joint Pain, Joint Stiffness, Decreased Range of Motion, Joint Swelling or Erythema of Joints.    INTEGUMENTARY- Denies Lumps, Sores, Itching, Dryness, Color Change, Changes in Hair, Brittle Nails, Discolored Nails, Thickened Nails, Growths or Alopecia.    Medications      Current Outpatient Medications:   •  conjugated estrogens (PREMARIN) 0.625 MG/GM vaginal cream, Insert 1 g into the vagina 2 (Two) Times a Week., Disp: , Rfl:   •  diphenhydrAMINE (BENADRYL) 25 mg, Take 1 capsule by mouth At Night As Needed., Disp: , Rfl:   •  Loratadine-Pseudoephedrine (CLARITIN-D 24 HOUR PO), Take 1 tablet by mouth Daily As Needed., Disp: , Rfl:   •  mometasone (NASONEX) 50 MCG/ACT nasal spray, 2 sprays into the nostril(s) as directed by provider Daily., Disp: 1 each, Rfl: 12     Allergies    Allergies   Allergen Reactions   • Sulfa Antibiotics Anaphylaxis   • Banana GI Intolerance   • Blueberry [Vaccinium Angustifolium] Other (See Comments)     PER ALLERGY TESTING   • Citrus GI Intolerance   • Coconut Unknown (See Comments)     Per testing   • Dairy Aid [Lactase]    • Eggs Or Egg-Derived Products Diarrhea   • Pineapple GI Intolerance   • Wheat Bran Other (See Comments)     Nasal congestion   • Cephalosporins Rash       Problem List    Patient Active Problem List   Diagnosis   • Anxiety   • Oral thrush       Medications, Allergies, Problems List and Past History were reviewed and updated.    Physical Examination    /72 (BP Location: Right arm, Patient Position: Sitting, Cuff Size: Adult)   Pulse 72   Temp 97.4 °F (36.3 °C) (Temporal)    "Resp 16   Ht 158.1 cm (62.25\")   Wt 63 kg (139 lb)   LMP  (LMP Unknown) Comment: LAST PAP 2017 BY DR GROVER  Breastfeeding? No   BMI 25.22 kg/m²     HEENT: Head- Normocephalic Atraumatic. Facies- Within normal limits. Pinnas- Normal texture and shape bilaterally. Canals- Normal bilaterally. TMs- Normal bilaterally. Nares- Patent bilaterally. Nasal Septum- is normal. There is no tenderness to palpation over the frontal or maxillary sinuses. Lids- Normal bilaterally. Conjunctiva- Clear bilaterally. Sclera- Anicteric bilaterally. Oropharynx- Moist with no lesions. Tonsils- No enlargement, erythema or exudate.    Neck: Thyroid- non enlarged, symmetric and has no nodules. No bruits are detected. ROM- Normal Range of Motion with no rigidity.    Lungs: Auscultation- Clear to auscultation bilaterally. There are no retractions, clubbing or cyanosis. The Expiratory to Inspiratory ratio is equal.    Lymph Nodes: Cervical- no enlarged lymph nodes noted. Clavicular- no enlarged supraclavicular lymph nodes noted. Axillary- no enlarged axillary lymph nodes noted. Inguinal- no enlarged inguinal lymph nodes noted.    Cardiovascular: There are no carotid bruits. Heart- Normal Rate with Regular rhythm and no murmurs. There are no gallops. There are no rubs. In the lower extremities there is no edema. The upper extremities do not have edema.    Abdomen: Soft, benign, non-tender with no masses, hernias, organomegaly or scars.    Breast: Normal contours bilaterally with no masses, discharge, skin changes or lumps. There are no scars noted.    Back: The patient has a normal spinal curvature with no evidence of scoliosis. There are no skin lesions noted on the back. Palpation reveals no tenderness and normal muscle tone. The straight leg raising test is negative. The back has normal flexion, extension, rotation, and lateral bending.    Lower Extremity Neuro Exam: Muscle Strength is 5/5 in all major lower extremity muscle groups. Deep " Tendon Reflexes are 2+ and equal in the patellar and Achilles distributions bilaterally. Sensation is normal in all distributions.    Dermatologic: The patient has no worrisome or suspicious skin lesions noted.    Impression and Assessment    Normal Physical Examination.    Encounter for Screening for Malignant Neoplasm of the Colon.    Low Back Pain.    Hearing Loss.    Plan    Hearing Loss Plan: She was referred to audiology.    Low Back Pain Plan: Encouraged core strengthening exercises and a better mattress. Consider PT if not helping.    Counseling was provided regarding: Adequate Aerobic Exercise, Dental Visits, Flossing Teeth and Heart Healthy Diet.    The following was ordered for screening and health maintenance: CBC w Automated Diff, CMP, Colonoscopy, Lipid Profile, TSH and U/A.       Immunizations Ordered and Administered: None.    Анна was seen today for annual exam.    Diagnoses and all orders for this visit:    Physical exam  -     Ambulatory Referral to Gynecology  -     Comprehensive Metabolic Panel  -     Lipid Panel  -     CBC & Differential  -     TSH  -     POC Urinalysis Dipstick, Automated    Bilateral hearing loss, unspecified hearing loss type  -     Ambulatory Referral to Audiology    Screening for colon cancer  -     Ambulatory Referral For Screening Colonoscopy    Acute midline back pain, unspecified back location        Return to Office    The patient was instructed to return for follow-up in 1 year. Next Visit: Physical.    The patient was instructed to return sooner if the condition changes, worsens, or does not resolve.

## 2019-11-07 LAB — BACTERIA SPEC AEROBE CULT: NORMAL

## 2020-03-27 ENCOUNTER — TELEPHONE (OUTPATIENT)
Dept: INTERNAL MEDICINE | Facility: CLINIC | Age: 60
End: 2020-03-27

## 2020-03-27 NOTE — TELEPHONE ENCOUNTER
Spoke to patient, she stated that she just had some questions about what to do if she started to get symptoms of COVID. I went over the protocol with patient and assured her that if she started to see symptoms to give us a call back and we could go back over it with her. Patient stated that her daughter is in and out of the house and she is worried that she will bring the virus to her but the daughter has been keeping the 6 feet distance. I advised patient to continue that and just keep washing her hands frequently and wiping down surfaces in her house like door knobs. Patient verbalized good understanding and said thank you.

## 2020-03-27 NOTE — TELEPHONE ENCOUNTER
Pt says she has asthma and is prediabetic which makes her high risk for Covid-19.  Pt is requesting a call back to discuss what she needs to do if she starts coming down with symptoms.

## 2020-03-31 ENCOUNTER — TELEPHONE (OUTPATIENT)
Dept: INTERNAL MEDICINE | Facility: CLINIC | Age: 60
End: 2020-03-31

## 2020-03-31 NOTE — TELEPHONE ENCOUNTER
PT CALLED TO SEE IF SHE CAN GET AN PRESCRIPTION FOR CLARITIN D. PLEASE ADVISE IF SOMETHING CALLED IN.     SENT TO [facility] IN [place] ON [address]     PLEASE ADVISE [phone] .

## 2020-04-30 ENCOUNTER — TELEPHONE (OUTPATIENT)
Dept: INTERNAL MEDICINE | Facility: CLINIC | Age: 60
End: 2020-04-30

## 2020-06-27 DIAGNOSIS — R51.9 NONINTRACTABLE HEADACHE, UNSPECIFIED CHRONICITY PATTERN, UNSPECIFIED HEADACHE TYPE: ICD-10-CM

## 2020-06-27 DIAGNOSIS — J30.9 ALLERGIC RHINITIS, UNSPECIFIED SEASONALITY, UNSPECIFIED TRIGGER: ICD-10-CM

## 2020-06-29 RX ORDER — MOMETASONE FUROATE 50 UG/1
SPRAY, METERED NASAL
Qty: 1 EACH | Refills: 5 | Status: SHIPPED | OUTPATIENT
Start: 2020-06-29

## 2020-09-21 ENCOUNTER — TELEPHONE (OUTPATIENT)
Dept: INTERNAL MEDICINE | Facility: CLINIC | Age: 60
End: 2020-09-21

## 2020-09-21 NOTE — TELEPHONE ENCOUNTER
Patient states that she is having severe back spasms and Dr. Mendenhall had given her medicine when she had this before and wondered if he could prescribe her some more.  She can be reached at [phone]    [facility], [address]

## 2020-09-21 NOTE — TELEPHONE ENCOUNTER
Please call in flexeril 10 mg po TID prn spasm.  #30 NR  If symptoms worsen, don't improve, develops a fever needs to be seen.   Oseas Mendenhall MD  15:16 EDT  09/21/20

## 2020-09-22 RX ORDER — CYCLOBENZAPRINE HCL 10 MG
10 TABLET ORAL 3 TIMES DAILY PRN
Qty: 30 TABLET | Refills: 0 | Status: SHIPPED | OUTPATIENT
Start: 2020-09-22 | End: 2021-05-25 | Stop reason: SDUPTHER

## 2020-09-22 NOTE — TELEPHONE ENCOUNTER
Medication has been sent into pharmacy and patient has been notified of providers message. Patient verb good understanding.

## 2021-01-05 ENCOUNTER — TELEPHONE (OUTPATIENT)
Dept: INTERNAL MEDICINE | Facility: CLINIC | Age: 61
End: 2021-01-05

## 2021-01-05 NOTE — TELEPHONE ENCOUNTER
Caller:  REEMA SRIVASTAVA    Relationship to patient: SELF    Best call back number: [phone]    Concerns or Questions if Applicable: PATIENT CALLED AND SAID HER DAUGHTER HAS BEEN EXPOSED TO THE VIRUS AND SHE WASN'T SURE WHAT SHE NEEDED TO DO; PATIENT IS ASTHMATIC; SHE WAS ALSO ASKING ABOUT AN ANTIBODY TEST

## 2021-01-15 ENCOUNTER — TELEPHONE (OUTPATIENT)
Dept: INTERNAL MEDICINE | Facility: CLINIC | Age: 61
End: 2021-01-15

## 2021-01-15 NOTE — TELEPHONE ENCOUNTER
PATIENT HAS CALLED REQUESTING A CALL BACK TO DISCUSS WHICH VACCINE IS APPROPRIATE FOR HER TO TAKE DUE TO HEALTH HISTORY.    PLEASE ADVISE    CALL BACK NUMBER [phone]

## 2021-01-18 NOTE — TELEPHONE ENCOUNTER
Spoke with pt and advised. She verbalized good understanding, thanked our office and we ended the call.

## 2021-02-15 ENCOUNTER — TELEPHONE (OUTPATIENT)
Dept: INTERNAL MEDICINE | Facility: CLINIC | Age: 61
End: 2021-02-15

## 2021-02-15 NOTE — TELEPHONE ENCOUNTER
PT SAID TESTED POSITIVE COVID BUT IS NOT HAVING ANY SYMPTOMS.  PT SAID SHE HAS ASTHMA AND IS PREDIABETIC. PT IS REQUESTING A CALL BACK TO DISCUSS WHAT SHE NEEDS TO DO.

## 2021-02-17 ENCOUNTER — TELEPHONE (OUTPATIENT)
Dept: INTERNAL MEDICINE | Facility: CLINIC | Age: 61
End: 2021-02-17

## 2021-02-17 NOTE — TELEPHONE ENCOUNTER
If she is asymptomatic, I would not recommend treatment.  If she starts to develop symptoms, let me know and we'll discuss treatment options.  Oseas Mendenhall MD  11:01 EST  02/17/21

## 2021-02-17 NOTE — TELEPHONE ENCOUNTER
S/W pt, informed that Dr Aburto said if  she is asymptomatic,he does recommend treatment.  If she starts to develop symptoms, let us know and we'll discuss treatment options. Verb understanding and states she started with sinus congestion and post nasal drng last night but that is all the symptoms she is having.

## 2021-02-17 NOTE — TELEPHONE ENCOUNTER
S/W pt, states she forgot to ask earlier but wants to know if okay for her to continue to work.  States she works from home anyway and is self guaranteed at home but wanted to know if okay to continue to work.  Expl that is fine as long as she feels like it.  States she feels okay.  Expl as long as feeling okay and she is not around anyone that it is okay.  Verb apprec.

## 2021-02-17 NOTE — TELEPHONE ENCOUNTER
ILYA CALLED BACK SHE STATES THAT SHE WOULD STILL LIKE A CALL FROM SOMEONE AT THE OFFICE TO DISCUSS HER POSITIVE COVID-19 RESULTS. THE PATIENT WANTS TO KNOW IF THE DOCTOR FEELS THAT SHE SHOULD BE ON ANY MEDICATION.

## 2021-04-22 ENCOUNTER — OFFICE VISIT (OUTPATIENT)
Dept: INTERNAL MEDICINE | Facility: CLINIC | Age: 61
End: 2021-04-22

## 2021-04-22 VITALS
RESPIRATION RATE: 18 BRPM | DIASTOLIC BLOOD PRESSURE: 80 MMHG | BODY MASS INDEX: 27.97 KG/M2 | WEIGHT: 152 LBS | HEART RATE: 84 BPM | SYSTOLIC BLOOD PRESSURE: 134 MMHG | HEIGHT: 62 IN | TEMPERATURE: 97.3 F

## 2021-04-22 DIAGNOSIS — Z00.00 PHYSICAL EXAM: Primary | ICD-10-CM

## 2021-04-22 DIAGNOSIS — R82.998 LEUKOCYTES IN URINE: ICD-10-CM

## 2021-04-22 LAB
25(OH)D3 SERPL-MCNC: 31.2 NG/ML
ALBUMIN SERPL-MCNC: 4.7 G/DL (ref 3.5–5.2)
ALBUMIN/GLOB SERPL: 1.8 G/DL
ALP SERPL-CCNC: 106 U/L (ref 39–117)
ALT SERPL W P-5'-P-CCNC: 14 U/L (ref 1–33)
ANION GAP SERPL CALCULATED.3IONS-SCNC: 9.9 MMOL/L (ref 5–15)
AST SERPL-CCNC: 18 U/L (ref 1–32)
BASOPHILS # BLD AUTO: 0.05 10*3/MM3 (ref 0–0.2)
BASOPHILS NFR BLD AUTO: 0.8 % (ref 0–1.5)
BILIRUB BLD-MCNC: NEGATIVE MG/DL
BILIRUB SERPL-MCNC: 0.6 MG/DL (ref 0–1.2)
BUN SERPL-MCNC: 10 MG/DL (ref 6–20)
BUN/CREAT SERPL: 14.3 (ref 7–25)
CALCIUM SPEC-SCNC: 9.5 MG/DL (ref 8.6–10.5)
CHLORIDE SERPL-SCNC: 105 MMOL/L (ref 98–107)
CHOLEST SERPL-MCNC: 211 MG/DL (ref 0–200)
CLARITY, POC: ABNORMAL
CO2 SERPL-SCNC: 30.1 MMOL/L (ref 22–29)
COLOR UR: YELLOW
CREAT SERPL-MCNC: 0.7 MG/DL (ref 0.57–1)
DEPRECATED RDW RBC AUTO: 42.2 FL (ref 37–54)
EOSINOPHIL # BLD AUTO: 0.25 10*3/MM3 (ref 0–0.4)
EOSINOPHIL NFR BLD AUTO: 4 % (ref 0.3–6.2)
ERYTHROCYTE [DISTWIDTH] IN BLOOD BY AUTOMATED COUNT: 12.5 % (ref 12.3–15.4)
EXPIRATION DATE: ABNORMAL
GFR SERPL CREATININE-BSD FRML MDRD: 86 ML/MIN/1.73
GLOBULIN UR ELPH-MCNC: 2.6 GM/DL
GLUCOSE SERPL-MCNC: 96 MG/DL (ref 65–99)
GLUCOSE UR STRIP-MCNC: NEGATIVE MG/DL
HCT VFR BLD AUTO: 46.4 % (ref 34–46.6)
HDLC SERPL-MCNC: 78 MG/DL (ref 40–60)
HGB BLD-MCNC: 15.7 G/DL (ref 12–15.9)
IMM GRANULOCYTES # BLD AUTO: 0.01 10*3/MM3 (ref 0–0.05)
IMM GRANULOCYTES NFR BLD AUTO: 0.2 % (ref 0–0.5)
KETONES UR QL: NEGATIVE
LDLC SERPL CALC-MCNC: 111 MG/DL (ref 0–100)
LDLC/HDLC SERPL: 1.37 {RATIO}
LEUKOCYTE EST, POC: ABNORMAL
LYMPHOCYTES # BLD AUTO: 0.97 10*3/MM3 (ref 0.7–3.1)
LYMPHOCYTES NFR BLD AUTO: 15.5 % (ref 19.6–45.3)
Lab: ABNORMAL
MCH RBC QN AUTO: 31.4 PG (ref 26.6–33)
MCHC RBC AUTO-ENTMCNC: 33.8 G/DL (ref 31.5–35.7)
MCV RBC AUTO: 92.8 FL (ref 79–97)
MONOCYTES # BLD AUTO: 0.53 10*3/MM3 (ref 0.1–0.9)
MONOCYTES NFR BLD AUTO: 8.5 % (ref 5–12)
NEUTROPHILS NFR BLD AUTO: 4.43 10*3/MM3 (ref 1.7–7)
NEUTROPHILS NFR BLD AUTO: 71 % (ref 42.7–76)
NITRITE UR-MCNC: NEGATIVE MG/ML
NRBC BLD AUTO-RTO: 0 /100 WBC (ref 0–0.2)
PH UR: 8 [PH] (ref 5–8)
PLATELET # BLD AUTO: 260 10*3/MM3 (ref 140–450)
PMV BLD AUTO: 10.9 FL (ref 6–12)
POTASSIUM SERPL-SCNC: 4.2 MMOL/L (ref 3.5–5.2)
PROT SERPL-MCNC: 7.3 G/DL (ref 6–8.5)
PROT UR STRIP-MCNC: NEGATIVE MG/DL
RBC # BLD AUTO: 5 10*6/MM3 (ref 3.77–5.28)
RBC # UR STRIP: NEGATIVE /UL
SODIUM SERPL-SCNC: 145 MMOL/L (ref 136–145)
SP GR UR: 1.01 (ref 1–1.03)
TRIGL SERPL-MCNC: 130 MG/DL (ref 0–150)
TSH SERPL DL<=0.05 MIU/L-ACNC: 2.5 UIU/ML (ref 0.27–4.2)
UROBILINOGEN UR QL: NORMAL
VLDLC SERPL-MCNC: 22 MG/DL (ref 5–40)
WBC # BLD AUTO: 6.24 10*3/MM3 (ref 3.4–10.8)

## 2021-04-22 PROCEDURE — 36415 COLL VENOUS BLD VENIPUNCTURE: CPT | Performed by: INTERNAL MEDICINE

## 2021-04-22 PROCEDURE — 85025 COMPLETE CBC W/AUTO DIFF WBC: CPT | Performed by: INTERNAL MEDICINE

## 2021-04-22 PROCEDURE — 84443 ASSAY THYROID STIM HORMONE: CPT | Performed by: INTERNAL MEDICINE

## 2021-04-22 PROCEDURE — 82306 VITAMIN D 25 HYDROXY: CPT | Performed by: INTERNAL MEDICINE

## 2021-04-22 PROCEDURE — 81003 URINALYSIS AUTO W/O SCOPE: CPT | Performed by: INTERNAL MEDICINE

## 2021-04-22 PROCEDURE — 87086 URINE CULTURE/COLONY COUNT: CPT | Performed by: INTERNAL MEDICINE

## 2021-04-22 PROCEDURE — 80061 LIPID PANEL: CPT | Performed by: INTERNAL MEDICINE

## 2021-04-22 PROCEDURE — 99396 PREV VISIT EST AGE 40-64: CPT | Performed by: INTERNAL MEDICINE

## 2021-04-22 PROCEDURE — 80053 COMPREHEN METABOLIC PANEL: CPT | Performed by: INTERNAL MEDICINE

## 2021-04-22 NOTE — PROGRESS NOTES
Chief Complaint   Patient presents with   • Annual Exam       History of Present Illness    The patient presents for an established patient physical examination and health maintenance visit.    Review of Systems    CONSTITUTIONAL- Denies Unexplained Weight Loss, Fever, Chills, Sweats, Fatigue, Weakness or Malaise.    NECK- Denies Decreased Range of Motion, Stiffness, Thyroid Nodules, Enlarged Lymph Nodes or Goiter.    EYES- Denies Eye Pain, Eye Drainage, Eye Redness, Eye Discharge, Decreased Vision, Visual Disturbance, Diplopia, Visual Loss or Swollen Eyelid.    HENT- Denies Nasal Discharge, Sore Throat, Ear Pain, Ear Drainage, Headache, Sinus Pain, Nasal Congestion, Decreased Hearing or Tinnitus.    PULMONARY- Denies Wheezing, Dyspnea, Sputum Production, Cough, Hemoptysis or Pleuritic Chest Pain.    GASTROINTESTINAL- Denies Abdominal Pain, Nausea, Vomiting, Diarrhea, Blood per Rectum, Constipation or Heartburn.    CARDIOVASCULAR- Denies Chest Pain, Claudication, Edema, Syncope, Palpitations or Irregular Heart Beat.    GENITOURINARY- Denies Dysuria, Hematuria, Urinary Frequency, Urinary Leakage, Pelvic Pain, Vaginal Prolapse, Vaginal Discharge, Dyspareunia or Abnormal Menses.    ENDOCRINE- Denies Cold Intolerance, Depression, Hair Loss, Heat Intolerance, Memory Loss, Polydypsia, Polyphagia, Polyuria, Sleep Disturbance or Weight Gain.    NEUROLOGIC- Denies Seizures, Visual Changes, Confusion or Excessive Daytime Sleepiness.    MUSCULOSKELETAL- Denies Joint Pain, Joint Stiffness, Decreased Range of Motion, Joint Swelling or Erythema of Joints.    INTEGUMENTARY- Denies Rash, Lumps, Sores, Itching, Dryness, Color Change, Changes in Hair, Brittle Nails, Discolored Nails, Thickened Nails, Growths or Alopecia.    Medications      Current Outpatient Medications:   •  conjugated estrogens (PREMARIN) 0.625 MG/GM vaginal cream, Insert 1 g into the vagina 2 (Two) Times a Week., Disp: , Rfl:   •  diphenhydrAMINE (BENADRYL) 25  "mg, Take 1 capsule by mouth At Night As Needed., Disp: , Rfl:   •  mometasone (NASONEX) 50 MCG/ACT nasal spray, INSTILL 2 SPRAYS INTO THE NOSTRIL(S) AS DIRECTED BY PROVIDER DAILY., Disp: 1 each, Rfl: 5  •  cyclobenzaprine (FLEXERIL) 10 MG tablet, Take 1 tablet by mouth 3 (Three) Times a Day As Needed for Muscle Spasms., Disp: 30 tablet, Rfl: 0  •  loratadine-pseudoephedrine (Claritin-D 24 Hour)  MG per 24 hr tablet, Take 1 tablet by mouth Daily., Disp: 30 tablet, Rfl: 11     Allergies    Allergies   Allergen Reactions   • Sulfa Antibiotics Anaphylaxis   • Banana GI Intolerance   • Blueberry [Vaccinium Angustifolium] Other (See Comments)     PER ALLERGY TESTING   • Citrus Other (See Comments) and GI Intolerance     Ecsema   • Coconut Unknown (See Comments)     Per testing   • Dairy Aid [Lactase] Other (See Comments)     Nasal congestion   • Eggs Or Egg-Derived Products Diarrhea   • Pineapple GI Intolerance   • Wheat Bran Other (See Comments)     Nasal congestion   • Cephalosporins Rash       Problem List    Patient Active Problem List   Diagnosis   • Anxiety   • Oral thrush       Medications, Allergies, Problems List and Past History were reviewed and updated.    Physical Examination    /80 (BP Location: Left arm, Patient Position: Sitting, Cuff Size: Adult)   Pulse 84   Temp 97.3 °F (36.3 °C) (Infrared)   Resp 18   Ht 157.5 cm (62\")   Wt 68.9 kg (152 lb)   LMP  (LMP Unknown) Comment: [facility]   Breastfeeding No   BMI 27.80 kg/m²     HEENT: Head- Normocephalic Atraumatic. Facies- Within normal limits. Pinnas- Normal texture and shape bilaterally. Canals- Normal bilaterally. TMs- Normal bilaterally. Nares- Patent bilaterally. Nasal Septum- is normal. There is no tenderness to palpation over the frontal or maxillary sinuses. Lids- Normal bilaterally. Conjunctiva- Clear bilaterally. Sclera- Anicteric bilaterally. Oropharynx- Moist with no lesions. Tonsils- No enlargement, " erythema or exudate.    Neck: Thyroid- non enlarged, symmetric and has no nodules. No bruits are detected. ROM- Normal Range of Motion with no rigidity.    Lungs: Auscultation- Clear to auscultation bilaterally. There are no retractions, clubbing or cyanosis. The Expiratory to Inspiratory ratio is equal.    Lymph Nodes: Cervical- no enlarged lymph nodes noted. Clavicular- no enlarged supraclavicular lymph nodes noted. Axillary- no enlarged axillary lymph nodes noted. Inguinal- no enlarged inguinal lymph nodes noted.    Cardiovascular: There are no carotid bruits. Heart- Normal Rate with Regular rhythm and no murmurs. There are no gallops. There are no rubs. In the lower extremities there is no edema. The upper extremities do not have edema.    Abdomen: Soft, benign, non-tender with no masses, hernias, organomegaly or scars.    Breast: Normal contours bilaterally with no masses, discharge, skin changes or lumps. There are no scars noted.    Dermatologic: The patient has no worrisome or suspicious skin lesions noted.    Impression and Assessment    Normal Physical Examination.    Plan    Counseling was provided regarding: Adequate Aerobic Exercise, Dental Visits, Flossing Teeth and Heart Healthy Diet.    The following was ordered for screening and health maintenance: CBC w Automated Diff, CMP, Lipid Profile, TSH and U/A.       Immunizations Ordered and Administered: None.    Diagnoses and all orders for this visit:    1. Physical exam (Primary)  -     Comprehensive Metabolic Panel  -     Lipid Panel  -     CBC & Differential  -     TSH  -     Vitamin D 25 Hydroxy  -     POC Urinalysis Dipstick, Automated        Return to Office    The patient was instructed to return for follow-up in 1 year. Next Visit: Physical Examination. The patient was instructed to return sooner if the condition changes, worsens, or does not resolve.

## 2021-04-23 LAB — BACTERIA SPEC AEROBE CULT: ABNORMAL

## 2021-05-25 RX ORDER — CYCLOBENZAPRINE HCL 10 MG
10 TABLET ORAL 3 TIMES DAILY PRN
Qty: 30 TABLET | Refills: 0 | Status: SHIPPED | OUTPATIENT
Start: 2021-05-25 | End: 2023-01-19 | Stop reason: SDUPTHER

## 2021-05-25 NOTE — TELEPHONE ENCOUNTER
Caller: Анна Turpin    Relationship: Self    Best call back number: [phone]    Medication needed:   Requested Prescriptions     Pending Prescriptions Disp Refills   • cyclobenzaprine (FLEXERIL) 10 MG tablet 30 tablet 0     Sig: Take 1 tablet by mouth 3 (Three) Times a Day As Needed for Muscle Spasms.       Does the patient have less than a 3 day supply:  [] Yes  [x] No    What is the patient's preferred pharmacy: [facility]/PHARMACY #[id number] [facility] [address]  - [phone]  - [phone] FX

## 2021-05-25 NOTE — TELEPHONE ENCOUNTER
Last Office Visit: 04/22/2021  Next Office Visit: none scheduled     Labs completed in past 6 months? yes  Labs completed in past year? yes    Last Refill Date: 09/22/2020  Quantity: 30  Refills: 0     Pharmacy: [facility]/pharmacy #[id number] - [place], [place] - [address]  - [phone]  - [phone] FX   [address] , [facility] [address]   Phone:  [phone]  Fax:  [phone]    Please review pended refill request for any changes needed on refills or quantities. Thank you!

## 2021-07-08 ENCOUNTER — TELEPHONE (OUTPATIENT)
Dept: INTERNAL MEDICINE | Facility: CLINIC | Age: 61
End: 2021-07-08

## 2021-07-08 RX ORDER — LIDOCAINE HYDROCHLORIDE 20 MG/ML
SOLUTION OROPHARYNGEAL
Qty: 60 ML | Refills: 0 | Status: SHIPPED | OUTPATIENT
Start: 2021-07-08

## 2021-07-08 NOTE — TELEPHONE ENCOUNTER
Please call pt and let her know I have sent in lidocaine + nystatin for her to swish and spit 4 times daily. This should give some topical relief while she is healing. She will need to mix these two bottles together as pharmacy won't mix for her.   Also avoid hot drinks for the next few days.

## 2021-07-08 NOTE — TELEPHONE ENCOUNTER
PATIENT SAID THAT WHILE EATING SHE BURNED THE ROOF OF HER MOUTH. SHE DID THIS ON Sunday AND SHE SAID IT STILL HURTS AND SHE WANTS TO KNOW WHAT HER PCP THINKS SHE SHOULD DO.    CONTACT: [phone]

## 2021-07-09 NOTE — TELEPHONE ENCOUNTER
Detailed message left on Анна diaz  With all instructions . Advised her to call back if not doing any better

## 2022-01-25 ENCOUNTER — TELEPHONE (OUTPATIENT)
Dept: INTERNAL MEDICINE | Facility: CLINIC | Age: 62
End: 2022-01-25

## 2022-02-07 NOTE — TELEPHONE ENCOUNTER
Completed form placed up front in  file.     Pt notified that form is ready for .  Verb great apprec.

## 2023-01-19 ENCOUNTER — HOSPITAL ENCOUNTER (OUTPATIENT)
Dept: GENERAL RADIOLOGY | Facility: HOSPITAL | Age: 63
Discharge: HOME | End: 2023-01-19
Admitting: INTERNAL MEDICINE
Payer: COMMERCIAL

## 2023-01-19 ENCOUNTER — OFFICE VISIT (OUTPATIENT)
Dept: INTERNAL MEDICINE | Facility: CLINIC | Age: 63
End: 2023-01-19
Payer: COMMERCIAL

## 2023-01-19 VITALS
TEMPERATURE: 97.3 F | HEART RATE: 70 BPM | BODY MASS INDEX: 29.37 KG/M2 | SYSTOLIC BLOOD PRESSURE: 132 MMHG | DIASTOLIC BLOOD PRESSURE: 82 MMHG | OXYGEN SATURATION: 97 % | WEIGHT: 160.6 LBS

## 2023-01-19 DIAGNOSIS — M25.511 ACUTE PAIN OF RIGHT SHOULDER: Primary | ICD-10-CM

## 2023-01-19 PROCEDURE — 73030 X-RAY EXAM OF SHOULDER: CPT

## 2023-01-19 PROCEDURE — 99213 OFFICE O/P EST LOW 20 MIN: CPT | Performed by: INTERNAL MEDICINE

## 2023-01-19 RX ORDER — CYCLOBENZAPRINE HCL 10 MG
10 TABLET ORAL 3 TIMES DAILY PRN
Qty: 30 TABLET | Refills: 0 | Status: SHIPPED | OUTPATIENT
Start: 2023-01-19

## 2023-01-19 RX ORDER — MELOXICAM 15 MG/1
15 TABLET ORAL DAILY PRN
Qty: 30 TABLET | Refills: 2 | Status: SHIPPED | OUTPATIENT
Start: 2023-01-19

## 2023-01-19 RX ORDER — FLUOCINONIDE 0.5 MG/G
OINTMENT TOPICAL
COMMUNITY

## 2023-01-19 RX ORDER — ESTRADIOL 0.1 MG/G
CREAM VAGINAL
COMMUNITY
Start: 2022-12-21

## 2023-01-19 NOTE — PROGRESS NOTES
Subjective       Анна Turpin is a 62 y.o. female.     Chief Complaint   Patient presents with   • Shoulder Pain     Right shoulder posterior and anterior pain       History obtained from the patient.      Shoulder Injury   The incident occurred at home. The right shoulder is affected. Incident onset: 1 month ago, and re-injured it 2 days ago. Injury mechanism: externally rotated and hyperextended. The quality of the pain is described as aching. Radiates to: radiates to right anterior neck, right mid/upper back, and right upper arm. The pain is moderate. Pertinent negatives include no chest pain, muscle weakness, numbness or tingling. The symptoms are aggravated by movement and overhead lifting (hurts to lay on it). She has tried heat (and took Flexeril) for the symptoms. The treatment provided mild relief.        The following portions of the patient's history were reviewed and updated as appropriate: allergies, current medications, past family history, past medical history, past social history, past surgical history and problem list.      Review of Systems   Constitutional: Negative for chills and fever.   Respiratory: Negative for shortness of breath.    Cardiovascular: Negative for chest pain.   Musculoskeletal: Positive for neck pain. Negative for arthralgias and myalgias.   Skin: Negative for pallor.   Neurological: Negative for tingling, weakness and numbness.           Objective     Blood pressure 132/82, pulse 70, temperature 97.3 °F (36.3 °C), temperature source Infrared, weight 72.8 kg (160 lb 9.6 oz), SpO2 97 %, not currently breastfeeding.    Physical Exam  Vitals and nursing note reviewed.   Constitutional:       Appearance: She is well-developed and normal weight.   Neck:      Comments: There is no tenderness to palpation over the cervical spine or paraspinal muscles.  Cardiovascular:      Rate and Rhythm: Normal rate and regular rhythm.      Heart sounds: Normal heart sounds. No murmur  heard.  Pulmonary:      Effort: Pulmonary effort is normal.      Breath sounds: Normal breath sounds.   Musculoskeletal:         General: Normal range of motion.      Cervical back: Normal range of motion and neck supple.      Comments: There is no tenderness to palpation of the affected shoulder.  There is no erythema, edema, or warmth.  There is pain with abduction, external rotation, and internal rotation.  There is no pain with adduction.  The patient is able to put both hands behind the head, both hands behind the back, and do the crossover maneuver but has pain (varying from mild to severe) with the maneuvers.  Good  strength bilaterally.   Skin:     Findings: No rash.   Neurological:      Mental Status: She is alert.      Motor: Motor function is intact.      Deep Tendon Reflexes: Reflexes are normal and symmetric.      Comments: Upper extremity only examined.   Psychiatric:         Mood and Affect: Mood normal.           Assessment & Plan   Diagnoses and all orders for this visit:    1. Acute pain of right shoulder (Primary)  -     XR Shoulder 2+ View Right  -     meloxicam (MOBIC) 15 MG tablet; Take 1 tablet by mouth Daily As Needed for Moderate Pain.  Dispense: 30 tablet; Refill: 2     Continue heat and Flexeril as needed.  May add Tylenol as needed   Continue NSAIDS (Meloxicam) for 2 full weeks, then as needed.     The patient agrees to call if no better in 2 weeks.   May need PT and/your orthopedic referral.        Return if symptoms worsen or fail to improve.

## 2023-01-19 NOTE — PATIENT INSTRUCTIONS
Continue NSAIDS  (Meloxicam) for 2 full weeks, then as needed.   Please call if no better in 2 weeks.    Continue heat and Flexeril as needed.  May also add Tylenol.

## 2023-01-19 NOTE — TELEPHONE ENCOUNTER
Caller: Papi Анна E    Relationship: Self    Best call back number: [phone]    Requested Prescriptions:   Requested Prescriptions     Pending Prescriptions Disp Refills   • cyclobenzaprine (FLEXERIL) 10 MG tablet 30 tablet 0     Sig: Take 1 tablet by mouth 3 (Three) Times a Day As Needed for Muscle Spasms.        Pharmacy where request should be sent: [facility]/PH[facility]ACY #[id number] - [place], [place] - [address]  - [phone]  - [phone] FX     Additional details provided by patient: PATIENT STATES THIS WAS SUPPOSED TO BE REFILLED AFTER HER APPOINTMENT TODAY BUT WAS NOT AT PHARMACY YET.    Does the patient have less than a 3 day supply:  [x] Yes  [] No    Would you like a call back once the refill request has been completed: [x] Yes [] No    If the office needs to give you a call back, can they leave a voicemail: [x] Yes [] No    Claire Mandujano Rep   01/19/23 11:57 EST

## 2023-02-06 ENCOUNTER — TELEPHONE (OUTPATIENT)
Dept: INTERNAL MEDICINE | Facility: CLINIC | Age: 63
End: 2023-02-06
Payer: COMMERCIAL

## 2023-02-06 DIAGNOSIS — M25.511 ACUTE PAIN OF RIGHT SHOULDER: Primary | ICD-10-CM

## 2023-02-06 NOTE — TELEPHONE ENCOUNTER
Caller: Анна Turpin    Relationship: Self    Best call back number: [phone]    What is the medical concern/diagnosis: PAIN IN RIGHT SHOULDER    What specialty or service is being requested: PHYSICAL THERAPY    What is the provider, practice or medical service name: [facility] [place]    What is the office location: [facility]    Any additional details: PATIENT HAD SEEN PROVIDER ON 1/19/2023 FOR PAIN IN NECK, BACK AND SHOULDER. NECK AND BACK HAVE IMPROVED BUT SHE IS STILL HAVING PAIN IN SHOULDER THAT HASN'T IMPROVED AND WOULD LIKE TO GET PHYSICAL THERAPY TO HELP RESOLVE THAT PAIN.

## 2023-02-15 ENCOUNTER — TREATMENT (OUTPATIENT)
Dept: PHYSICAL THERAPY | Facility: CLINIC | Age: 63
End: 2023-02-15
Payer: COMMERCIAL

## 2023-02-15 DIAGNOSIS — M25.511 ACUTE PAIN OF RIGHT SHOULDER: Primary | ICD-10-CM

## 2023-02-15 PROCEDURE — 97161 PT EVAL LOW COMPLEX 20 MIN: CPT | Performed by: PHYSICAL THERAPIST

## 2023-02-15 PROCEDURE — 97530 THERAPEUTIC ACTIVITIES: CPT | Performed by: PHYSICAL THERAPIST

## 2023-02-15 PROCEDURE — 97110 THERAPEUTIC EXERCISES: CPT | Performed by: PHYSICAL THERAPIST

## 2023-02-15 NOTE — PROGRESS NOTES
"Physical Therapy Initial Evaluation and Plan of Care    [address], [address]  [place] [address]    Patient: Анна Turpin   : 1960  Diagnosis/ICD-10 Code:  Acute pain of right shoulder [M25.511]  Referring practitioner: Oseas Mendenhall MD  Date of Initial Visit: 2/15/2023  Today's Date: 2/15/2023  Patient seen for 1 sessions           Subjective Questionnaire: QuickDASH: 56.8%      Subjective Evaluation    History of Present Illness  Mechanism of injury: R shoulder pain. Patient initially experienced R shoulder pain about 2.5 months ago when lifting a heavy purse from in front of her to behind her. The pain gradually improved until 1 month ago, when her cat caused her to lose her balance on stairs and she fell backward. To prevent herself from sliding down the stairs, she braced with her R hand on a step and her arm went behind her (demonstrates a \"dip\" motion. She started getting neck and mid back pain following this incident. Pain in these areas remains intermittent, though they remain stiff. She also notes loss of motion in her R shoulder.    CLOF: awakens from pain 3-4x/night due to positional intolerance on R side, has avoided driving on country roads due to gradually increasing pain turning the wheel, unable to clasp a bra behind her back, pain washing and drying her hair    Medical history: latex allergy, history of R shoulder pain, IBS      Patient Occupation: works as  from home, sits all day Pain  Current pain ratin  At best pain ratin  At worst pain ratin  Alleviating factors: none.  Exacerbated by: lying on affected side, overhead movement, reaching across her body.  Progression: worsening    Hand dominance: ambidextrous    Patient Goals  Patient goals for therapy: decreased pain and increased motion             Objective          Palpation     Right   Hypertonic in the infraspinatus, levator scapulae, pectoralis minor, teres minor and upper trapezius. Tenderness of " the infraspinatus, levator scapulae, pectoralis minor, teres minor and upper trapezius.     Tenderness     Right Shoulder  Tenderness in the biceps tendon (proximal).     Active Range of Motion   Left Shoulder   Flexion: 160 degrees   Extension: 75 degrees   Abduction: 170 degrees   External rotation BTH: T3   Internal rotation BTB: T5   Horizontal adduction: Left shoulder active horizontal adduction: touches R infra mid belly.     Right Shoulder   Flexion: 125 degrees with pain  Extension: 65 degrees   Abduction: 155 degrees with pain  External rotation BTH: T3   Internal rotation BTB: T11 with pain  Horizontal adduction: Right shoulder active horizontal adduction: touches L upper trap, pain.     Passive Range of Motion     Right Shoulder   Flexion: WFL  Abduction: Right shoulder passive abduction: 140, pain.   External rotation 90°: WFL    Joint Play   Left Shoulder  Joints within functional limits are the AC joint. Hypermobile in the anterior capsule and posterior capsule. Hypomobile in the thoracic spine and 1st rib.    Right Shoulder  Joints within functional limits are the AC joint. Hypermobile in the anterior capsule and posterior capsule. Hypomobile in the thoracic spine and 1st rib.     Comments  Right 1st rib comments: pain.     Strength/Myotome Testing     Left Shoulder     Planes of Motion   Flexion: 4+   Extension: 4+   Abduction: 4+   External rotation at 0°: 4   Internal rotation at 0°: 4+     Isolated Muscles   Biceps: 5   Triceps: 5     Right Shoulder     Planes of Motion   Flexion: 4 (pain)   Extension: 4   Abduction: 4+ (pain)   External rotation at 0°: 4-   External rotation at 45°: 4- (pain)   External rotation at 90°: 4-   Internal rotation at 0°: 4   Internal rotation at 45°: 4-   Internal rotation at 90°: 4-   Right shoulder horizontal abduction strength: pain.   Horizontal adduction: 3+     Isolated Muscles   Biceps: 4+ (pain)   Serratus anterior: 4 (pain)   Triceps: 5     Tests     Right  Shoulder   Positive full can.   Negative apprehension, empty can, Hawkin's, lift-off and Neer's.           Assessment & Plan     Assessment  Impairments: abnormal muscle firing, abnormal or restricted ROM, activity intolerance, impaired physical strength, lacks appropriate home exercise program, pain with function and weight-bearing intolerance  Functional Limitations: carrying objects, lifting, sleeping, pulling, pushing, uncomfortable because of pain, moving in bed, reaching behind back, reaching overhead and unable to perform repetitive tasks  Assessment details: Patient presents with signs and symptoms consistent with R shoulder functional instability secondary to fall. Gross R shoulder weakness noted, especially in less stable positions.    Barriers to therapy: recurrent R shoulder pain  Prognosis: good    Goals  Plan Goals: Short Term Goals (4 wks)  1. Patient will improve Quick Dash score to < 48 %..  2. Pt will demonstrate symmetrical, pain-free shoulder AROM.  3. Patient will be independent and compliant with initial home exercise program.     Long Term Goals (8 wks)  1. Patient to demonstrate normal and pain free UE strength with MMTs.  2. Patient will improve Quick Dash score to < 40 %.  3. Patient to no longer awaken from R shoulder pain.  4. Pt. will be independent and compliant with advanced home exercise program to facilitate self-management of symptoms.      Plan  Therapy options: will be seen for skilled therapy services  Planned modality interventions: cryotherapy, dry needling, TENS and thermotherapy (hydrocollator packs)  Planned therapy interventions: manual therapy, neuromuscular re-education, soft tissue mobilization, spinal/joint mobilization, therapeutic activities, strengthening, joint mobilization, home exercise program, functional ROM exercises, abdominal trunk stabilization, ADL retraining and balance/weight-bearing training  Frequency: 2x week  Duration in weeks: 8  Treatment plan  discussed with: patient  Plan details: 2x/week for 8 weeks        Timed:  Manual Therapy:    0     mins  72614;  Therapeutic Exercise:    10     mins  39085;     Neuromuscular Luis:    0    mins  63232;    Therapeutic Activity:     15     mins  03146;     Gait Trainin     mins  28854;     Ultrasound:     0     mins  94854;    Electrical Stimulation:    0     mins  67041 ( );    Untimed:  Electrical Stimulation:    0     mins  10228 ( );  Mechanical Traction:    0     mins  38083;     Timed Treatment:   25   mins   Total Treatment:     50   mins    PT SIGNATURE: Dominik Morocho PT   License Number: [id number]  DATE TREATMENT INITIATED: 2/15/2023    Initial Certification  Certification Period: 2023  I certify that the therapy services are furnished while this patient is under my care.  The services outlined above are required by this patient, and will be reviewed every 90 days.     PHYSICIAN: Oseas Mendenhall MD      DATE:     Please sign and return via fax to [phone].. Thank you, [facility].

## 2023-02-17 ENCOUNTER — TREATMENT (OUTPATIENT)
Dept: PHYSICAL THERAPY | Facility: CLINIC | Age: 63
End: 2023-02-17
Payer: COMMERCIAL

## 2023-02-17 DIAGNOSIS — M25.511 ACUTE PAIN OF RIGHT SHOULDER: Primary | ICD-10-CM

## 2023-02-17 PROCEDURE — 97140 MANUAL THERAPY 1/> REGIONS: CPT | Performed by: PHYSICAL THERAPIST

## 2023-02-17 PROCEDURE — 97112 NEUROMUSCULAR REEDUCATION: CPT | Performed by: PHYSICAL THERAPIST

## 2023-02-17 PROCEDURE — 97110 THERAPEUTIC EXERCISES: CPT | Performed by: PHYSICAL THERAPIST

## 2023-02-17 NOTE — PROGRESS NOTES
Physical Therapy Daily Progress Note    [address] [facility], [address]  [place] [address]      Patient: Анна Turpin   : 1960  Diagnosis/ICD-10 Code:  Acute pain of right shoulder [M25.511]  Referring practitioner: Oseas Mendenhall MD  Date of Initial Visit: Type: THERAPY  Noted: 2/15/2023  Today's Date: 2023  Patient seen for 2 sessions           Patient reports: her shoulder was sore after last visit.      Objective   See Exercise, Manual, and Modality Logs for complete treatment.       Assessment/Plan  R shoulder AROM abduction improved following manual interventions and R shoulder stretches. Added periscapular strengthening with posterior and anterior mm, with no increased pain. Patient reported her shoulder felt tired but looser after treatment.            Timed:  Manual Therapy:    14     mins  70170;  Therapeutic Exercise:    25     mins  83171;     Neuromuscular Luis:   10    mins  41027;    Therapeutic Activity:     0     mins  37862;     Gait Trainin     mins  08312;     Ultrasound:     0     mins  60710;    Electrical Stimulation:    0     mins  72417 ( );    Untimed:  Electrical Stimulation:    0     mins  86657 ( );  Mechanical Traction:    0     mins  18142;     Timed Treatment:   49   mins   Total Treatment:     49   mins    Dominik Morocho PT  Physical Therapist

## 2023-02-20 ENCOUNTER — TREATMENT (OUTPATIENT)
Dept: PHYSICAL THERAPY | Facility: CLINIC | Age: 63
End: 2023-02-20
Payer: COMMERCIAL

## 2023-02-20 DIAGNOSIS — M25.511 ACUTE PAIN OF RIGHT SHOULDER: Primary | ICD-10-CM

## 2023-02-20 PROCEDURE — 97112 NEUROMUSCULAR REEDUCATION: CPT | Performed by: PHYSICAL THERAPIST

## 2023-02-20 PROCEDURE — 97140 MANUAL THERAPY 1/> REGIONS: CPT | Performed by: PHYSICAL THERAPIST

## 2023-02-20 PROCEDURE — 97110 THERAPEUTIC EXERCISES: CPT | Performed by: PHYSICAL THERAPIST

## 2023-02-20 NOTE — PROGRESS NOTES
Physical Therapy Daily Progress Note    [address] [facility], [address]  [place] [address]      Patient: Анна Turpin   : 1960  Diagnosis/ICD-10 Code:  Acute pain of right shoulder [M25.511]  Referring practitioner: Oseas Mendenhall MD  Date of Initial Visit: Type: THERAPY  Noted: 2/15/2023  Today's Date: 2023  Patient seen for 3 sessions           Patient reports: she can move her shoulder better than she could before. She reports moderate pain this morning.      Objective   See Exercise, Manual, and Modality Logs for complete treatment.       Assessment/Plan  Progressed rotator cuff strengthening and periscapular strengthening with no c/o pain. R shoulder AROM continues to improve visit to visit. Added shoulder IR stretch to aid in reaching behind her back. No change in symptoms following treatment.            Timed:  Manual Therapy:    24     mins  91610;  Therapeutic Exercise:    15     mins  18991;     Neuromuscular Luis:   11    mins  47771;    Therapeutic Activity:     0     mins  23845;     Gait Trainin     mins  31661;     Ultrasound:     0     mins  43189;    Electrical Stimulation:    0     mins  80628 ( );    Untimed:  Electrical Stimulation:    0     mins  64503 ( );  Mechanical Traction:    0     mins  43934;     Timed Treatment:   55   mins   Total Treatment:     55   mins    Dominik Morocho PT  Physical Therapist

## 2023-02-23 ENCOUNTER — TREATMENT (OUTPATIENT)
Dept: PHYSICAL THERAPY | Facility: CLINIC | Age: 63
End: 2023-02-23
Payer: COMMERCIAL

## 2023-02-23 DIAGNOSIS — M25.511 ACUTE PAIN OF RIGHT SHOULDER: Primary | ICD-10-CM

## 2023-02-23 PROCEDURE — 97112 NEUROMUSCULAR REEDUCATION: CPT | Performed by: PHYSICAL THERAPIST

## 2023-02-23 PROCEDURE — 97110 THERAPEUTIC EXERCISES: CPT | Performed by: PHYSICAL THERAPIST

## 2023-02-23 PROCEDURE — 97140 MANUAL THERAPY 1/> REGIONS: CPT | Performed by: PHYSICAL THERAPIST

## 2023-02-23 NOTE — PROGRESS NOTES
Physical Therapy Daily Progress Note    [address] [facility], [address]  [place] [address]      Patient: Анна Turpin   : 1960  Diagnosis/ICD-10 Code:  Acute pain of right shoulder [M25.511]  Referring practitioner: Oseas Mendenhall MD  Date of Initial Visit: Type: THERAPY  Noted: 2/15/2023  Today's Date: 2023  Patient seen for 4 sessions           Patient reports: her shoulder pain has started to improve.      Objective          Active Range of Motion     Right Shoulder   Flexion: 148 degrees with pain  Abduction: 175 degrees with pain  Internal rotation BTB: T12 with pain      See Exercise, Manual, and Modality Logs for complete treatment.       Assessment/Plan  R shoulder PROM is much greater and less painful than previous visits. Able to progress rotator cuff and periscapular strengthening. She reported pain in higher AROM during prone shoulder exercises, but she tolerated them well when remaining in pain-free ROM.            Timed:  Manual Therapy:    20     mins  82093;  Therapeutic Exercise:    23     mins  98602;     Neuromuscular Luis:   8    mins  01520;    Therapeutic Activity:     0     mins  75344;     Gait Trainin     mins  70601;     Ultrasound:     0     mins  50824;    Electrical Stimulation:    0     mins  13582 ( );    Untimed:  Electrical Stimulation:    0     mins  30364 ( );  Mechanical Traction:    0     mins  80640;     Timed Treatment:   51   mins   Total Treatment:     51   mins    Dominik Morocho PT  Physical Therapist

## 2023-03-02 ENCOUNTER — TREATMENT (OUTPATIENT)
Dept: PHYSICAL THERAPY | Facility: CLINIC | Age: 63
End: 2023-03-02
Payer: COMMERCIAL

## 2023-03-02 DIAGNOSIS — M25.511 ACUTE PAIN OF RIGHT SHOULDER: Primary | ICD-10-CM

## 2023-03-02 PROCEDURE — 97110 THERAPEUTIC EXERCISES: CPT | Performed by: PHYSICAL THERAPIST

## 2023-03-02 PROCEDURE — 97140 MANUAL THERAPY 1/> REGIONS: CPT | Performed by: PHYSICAL THERAPIST

## 2023-03-02 NOTE — PROGRESS NOTES
Physical Therapy Daily Progress Note    [address] [facility], [address]  [place] [address]      Patient: Анна Turpin   : 1960  Diagnosis/ICD-10 Code:  Acute pain of right shoulder [M25.511]  Referring practitioner: Oseas Mendenhall MD  Date of Initial Visit: Type: THERAPY  Noted: 2/15/2023  Today's Date: 3/2/2023  Patient seen for 5 sessions           Patient reports: her shoulder felt pretty good over the past 2 weeks but she rested the past 2 days due to soreness. She reports prone horiz abd and flexion were most painful.      Objective          Active Range of Motion     Right Shoulder   Flexion: 125 degrees with pain  Extension: 65 degrees   Abduction: 175 degrees with pain  Internal rotation BTB: T9       See Exercise, Manual, and Modality Logs for complete treatment.       Assessment/Plan  Discussion with patient revealed she was doing exercises completed in clinic but no on HEP. She was educated to complete only exercises on HEP as the therapist may not prescribe an exercise completed in clinic if she cannot complete it without cueing. She demonstrates improved R shoulder AROM compared to eval and is reporting functional/symptomatic improvements.            Timed:  Manual Therapy:    12     mins  76439;  Therapeutic Exercise:    28     mins  37697;     Neuromuscular Luis:   0    mins  01235;    Therapeutic Activity:     0     mins  26187;     Gait Trainin     mins  91538;     Ultrasound:     0     mins  97609;    Electrical Stimulation:    0     mins  02865 ( );    Untimed:  Electrical Stimulation:    0     mins  74725 ( );  Mechanical Traction:    0     mins  32587;     Timed Treatment:   40   mins   Total Treatment:     40   mins    Dominik Morocho PT  Physical Therapist

## 2023-03-06 ENCOUNTER — TREATMENT (OUTPATIENT)
Dept: PHYSICAL THERAPY | Facility: CLINIC | Age: 63
End: 2023-03-06
Payer: COMMERCIAL

## 2023-03-06 DIAGNOSIS — M25.511 ACUTE PAIN OF RIGHT SHOULDER: Primary | ICD-10-CM

## 2023-03-06 PROCEDURE — 97110 THERAPEUTIC EXERCISES: CPT | Performed by: PHYSICAL THERAPIST

## 2023-03-06 PROCEDURE — 97140 MANUAL THERAPY 1/> REGIONS: CPT | Performed by: PHYSICAL THERAPIST

## 2023-03-06 NOTE — PROGRESS NOTES
Physical Therapy Daily Progress Note    [address] [facility], [address]  [place] [address]      Patient: Анна Turpin   : 1960  Diagnosis/ICD-10 Code:  Acute pain of right shoulder [M25.511]  Referring practitioner: Oseas Mendenhall MD  Date of Initial Visit: Type: THERAPY  Noted: 2/15/2023  Today's Date: 3/6/2023  Patient seen for 6 sessions           Patient reports: changing out the clothes in her closet this weekend using her R UE and reported no pain.      Objective   See Exercise, Manual, and Modality Logs for complete treatment.       Assessment/Plan  AROM R shoulder flexion WNL and pain free today. Reaching across her body and behind her back are her most painful movements. No change to reaching across her body following STM to external rotators and posterior GH mobs. Continued R shoulder strengthening with good tolerance.            Timed:  Manual Therapy:    13     mins  79852;  Therapeutic Exercise:    30     mins  64218;     Neuromuscular Luis:   0    mins  30355;    Therapeutic Activity:     0     mins  34735;     Gait Trainin     mins  09721;     Ultrasound:     0     mins  25031;    Electrical Stimulation:    0     mins  60678 ( );    Untimed:  Electrical Stimulation:    0     mins  22851 ( );  Mechanical Traction:    0     mins  22625;     Timed Treatment:   43  mins   Total Treatment:     43   mins    Dominik Morocho PT  Physical Therapist

## 2023-03-10 ENCOUNTER — TREATMENT (OUTPATIENT)
Dept: PHYSICAL THERAPY | Facility: CLINIC | Age: 63
End: 2023-03-10
Payer: COMMERCIAL

## 2023-03-10 DIAGNOSIS — M25.511 ACUTE PAIN OF RIGHT SHOULDER: Primary | ICD-10-CM

## 2023-03-10 PROCEDURE — 97112 NEUROMUSCULAR REEDUCATION: CPT | Performed by: PHYSICAL THERAPIST

## 2023-03-10 PROCEDURE — 97140 MANUAL THERAPY 1/> REGIONS: CPT | Performed by: PHYSICAL THERAPIST

## 2023-03-10 PROCEDURE — 97110 THERAPEUTIC EXERCISES: CPT | Performed by: PHYSICAL THERAPIST

## 2023-03-10 NOTE — PROGRESS NOTES
Physical Therapy Daily Progress Note    [address] [facility], [address]  [place] [address]      Patient: Анна Turpin   : 1960  Diagnosis/ICD-10 Code:  Acute pain of right shoulder [M25.511]  Referring practitioner: Oseas Mendenhall MD  Date of Initial Visit: Type: THERAPY  Noted: 2/15/2023  Today's Date: 3/10/2023  Patient seen for 7 sessions           Patient reports: no significant soreness after last visit. She drove a lot over the past few days and was sore from that.      Objective   See Exercise, Manual, and Modality Logs for complete treatment.       Assessment/Plan  R AC joint was symptomatic with reaching behind her back and across her body today, with improved pain free AROM noted following mobs to her R AC joint. Progressed multidirectional shoulder strengthening, adding resistance many of her current exercises and adding shoulder ER/IR at 90 abd, which she reported replicating her main c/o of shoulder soreness. Also introduced scaption eccentrics to load supraspinatus.            Timed:  Manual Therapy:    8     mins  65346;  Therapeutic Exercise:    32     mins  27167;     Neuromuscular Luis:   10    mins  04760;    Therapeutic Activity:     0     mins  03740;     Gait Trainin     mins  60696;     Ultrasound:     0     mins  38698;    Electrical Stimulation:    0     mins  47193 ( );    Untimed:  Electrical Stimulation:    0     mins  28761 ( );  Mechanical Traction:    0     mins  80670;     Timed Treatment:   50   mins   Total Treatment:     50   mins    Dominik Morocho PT  Physical Therapist

## 2023-03-14 ENCOUNTER — TREATMENT (OUTPATIENT)
Dept: PHYSICAL THERAPY | Facility: CLINIC | Age: 63
End: 2023-03-14
Payer: COMMERCIAL

## 2023-03-14 DIAGNOSIS — M25.511 ACUTE PAIN OF RIGHT SHOULDER: Primary | ICD-10-CM

## 2023-03-14 PROCEDURE — 97110 THERAPEUTIC EXERCISES: CPT | Performed by: PHYSICAL THERAPIST

## 2023-03-14 PROCEDURE — 97140 MANUAL THERAPY 1/> REGIONS: CPT | Performed by: PHYSICAL THERAPIST

## 2023-03-14 NOTE — PROGRESS NOTES
Physical Therapy Daily Progress Note    [address] [facility], [address]  [place] [address]      Patient: Анна Turpin   : 1960  Diagnosis/ICD-10 Code:  Acute pain of right shoulder [M25.511]  Referring practitioner: Oseas Mendenhall MD  Date of Initial Visit: Type: THERAPY  Noted: 2/15/2023  Today's Date: 3/14/2023  Patient seen for 8 sessions           Patient reports: no change in shoulder pain since last visit.      Objective   See Exercise, Manual, and Modality Logs for complete treatment.       Assessment/Plan  Improved reaching across her body and behind her back following manual interventions. Progressed strength interventions with good tolerance.            Timed:  Manual Therapy:    24     mins  63508;  Therapeutic Exercise:    18     mins  97398;     Neuromuscular Luis:   0    mins  02997;    Therapeutic Activity:     0     mins  39913;     Gait Trainin     mins  14137;     Ultrasound:     0     mins  89546;    Electrical Stimulation:    0     mins  36398 ( );    Untimed:  Electrical Stimulation:    0     mins  67789 ( );  Mechanical Traction:    0     mins  80261;     Timed Treatment:   42   mins   Total Treatment:     42   mins    Dominik Morocho PT  Physical Therapist

## 2023-03-16 ENCOUNTER — TREATMENT (OUTPATIENT)
Dept: PHYSICAL THERAPY | Facility: CLINIC | Age: 63
End: 2023-03-16
Payer: COMMERCIAL

## 2023-03-16 DIAGNOSIS — M25.511 ACUTE PAIN OF RIGHT SHOULDER: Primary | ICD-10-CM

## 2023-03-16 PROCEDURE — 97110 THERAPEUTIC EXERCISES: CPT | Performed by: PHYSICAL THERAPIST

## 2023-03-16 PROCEDURE — 97530 THERAPEUTIC ACTIVITIES: CPT | Performed by: PHYSICAL THERAPIST

## 2023-03-16 PROCEDURE — 97140 MANUAL THERAPY 1/> REGIONS: CPT | Performed by: PHYSICAL THERAPIST

## 2023-03-16 NOTE — PROGRESS NOTES
Re-Assessment / Re-Certification      [address] [facility], [address]  [place] [address]    Patient: Анна Turpin   : 1960  Diagnosis/ICD-10 Code:  Acute pain of right shoulder [M25.511]  Referring practitioner: Oseas Mendenhall MD  Date of Initial Visit: Type: THERAPY  Noted: 2/15/2023  Today's Date: 3/16/2023  Patient seen for 9 sessions      Subjective:     Subjective Questionnaire: QuickDASH: 35%  Clinical Progress: improved  Home Program Compliance: Yes  Treatment has included: therapeutic exercise, neuromuscular re-education, manual therapy and therapeutic activity    Subjective Evaluation    History of Present Illness  Mechanism of injury: CLOF: awakens from pain 3-4x/night due to positional intolerance on R side, has avoided driving on country roads due to gradually increasing pain turning the wheel, unable to clasp a bra behind her back, pain washing and drying her hair, feels like she can raise her R UE overhead much easier      Subjective comment: Patient reports her shoulder still hurts to  her cat and reach across her body but no longer at rest. In general she feels like her shoulder was improving but regressed after a lot of driving on country roads last week.Pain  Current pain ratin  At best pain ratin  At worst pain ratin         Objective          Palpation     Right   Hypertonic in the infraspinatus, levator scapulae, pectoralis minor, teres minor and upper trapezius. Tenderness of the infraspinatus, levator scapulae, pectoralis minor, teres minor and upper trapezius.     Tenderness     Right Shoulder  Tenderness in the biceps tendon (proximal).     Active Range of Motion   Left Shoulder   Flexion: 160 degrees   Extension: 75 degrees   Abduction: 170 degrees   External rotation BTH: T3   Internal rotation BTB: T5   Horizontal adduction: Left shoulder active horizontal adduction: touches R infra mid belly.     Right Shoulder   Flexion: 145 (sore) degrees   Extension: 75  (sore) degrees   Abduction: 175 (sore) degrees   External rotation BTH: T3   Internal rotation BTB: T10 with pain  Horizontal adduction: Right shoulder active horizontal adduction: touches L upper trap, pain.     Passive Range of Motion     Right Shoulder   Flexion: WFL  Abduction: Right shoulder passive abduction: 140, pain.   External rotation 90°: WFL    Joint Play     Comments  Right 1st rib comments: pain.     Strength/Myotome Testing     Left Shoulder     Planes of Motion   Flexion: 4+   Extension: 4+   Abduction: 4+   Horizontal abduction: 4     Right Shoulder     Planes of Motion   Flexion: 4 (pain)   Extension: 5   Abduction: 4+   External rotation at 0°: 4   External rotation at 45°: 4 (pain)   External rotation at 90°: 4 (pain)   Internal rotation at 0°: 4+ (pain)   Internal rotation at 45°: 4   Internal rotation at 90°: 4 (pain)   Horizontal abduction: 4- (pain)     Isolated Muscles   Biceps: 5 (pain)   Serratus anterior: 5 (pain)     Tests     Right Shoulder   Positive full can.       Assessment & Plan     Assessment  Impairments: abnormal coordination, abnormal muscle firing, abnormal or restricted ROM, activity intolerance, impaired physical strength, pain with function and weight-bearing intolerance  Functional Limitations: carrying objects, lifting, sleeping, pulling, pushing, uncomfortable because of pain, moving in bed, reaching behind back, reaching overhead and unable to perform repetitive tasks  Assessment details: Patient presents with signs and symptoms consistent with R shoulder functional instability secondary to fall. Gross R shoulder weakness noted, especially in less stable positions.    3/16- Patient reports 50% perceived improvement in her R shoulder since starting PT. Though she reports continued functional impairments, her pain during these is much less overall. She demonstrates good R shoulder AROM and strength improvements though pain remains with both. Overall, she is progressing  as expected.    Barriers to therapy: recurrent R shoulder pain  Prognosis: good    Goals  Plan Goals: Short Term Goals (4 wks)  1. Patient will improve Quick Dash score to < 48 %. Met   2. Pt will demonstrate symmetrical, pain-free shoulder AROM. In progress  3. Patient will be independent and compliant with initial home exercise program. Met    Long Term Goals (8 wks)  1. Patient to demonstrate normal and pain free UE strength with MMTs. In progress  2. Patient will improve Quick Dash score to < 40 %. Met  3. Patient to no longer awaken from R shoulder pain. In progress  4. Pt. will be independent and compliant with advanced home exercise program to facilitate self-management of symptoms. In progress      Plan  Therapy options: will be seen for skilled therapy services  Planned modality interventions: cryotherapy, dry needling, TENS and thermotherapy (hydrocollator packs)  Planned therapy interventions: manual therapy, neuromuscular re-education, soft tissue mobilization, spinal/joint mobilization, therapeutic activities, strengthening, joint mobilization, home exercise program, functional ROM exercises, abdominal trunk stabilization, ADL retraining and balance/weight-bearing training  Frequency: 2x week  Duration in weeks: 4  Treatment plan discussed with: patient  Plan details: 2x/week for 4 weeks      Progress toward previous goals: Partially Met        Timeframe: 1 month  Prognosis to achieve goals: good    PT Signature: Dominik Morocho, PT  PT License [id number]    Based upon review of the patient's progress and continued therapy plan, it is my medical opinion that Анна Turpin should continue physical therapy treatment at [facility]  [address] [place] [id number] [phone].    Signature: __________________________________  Oseas Mendenhall MD    Timed:  Manual Therapy:    12     mins  69412;  Therapeutic Exercise:    27     mins  32304;     Neuromuscular  Luis:    0    mins  77745;    Therapeutic Activity:     15     mins  44730;     Gait Trainin     mins  92735;     Ultrasound:     0     mins  99391;    Electrical Stimulation:    0     mins  89284 ( );    Untimed:  Electrical Stimulation:    0     mins  87634 ( );  Mechanical Traction:    0     mins  73231;     Timed Treatment:   54   mins   Total Treatment:     54   mins

## 2023-03-20 ENCOUNTER — TREATMENT (OUTPATIENT)
Dept: PHYSICAL THERAPY | Facility: CLINIC | Age: 63
End: 2023-03-20
Payer: COMMERCIAL

## 2023-03-20 DIAGNOSIS — M25.511 ACUTE PAIN OF RIGHT SHOULDER: Primary | ICD-10-CM

## 2023-03-20 PROCEDURE — 97140 MANUAL THERAPY 1/> REGIONS: CPT | Performed by: PHYSICAL THERAPIST

## 2023-03-20 PROCEDURE — 97110 THERAPEUTIC EXERCISES: CPT | Performed by: PHYSICAL THERAPIST

## 2023-03-20 PROCEDURE — 97112 NEUROMUSCULAR REEDUCATION: CPT | Performed by: PHYSICAL THERAPIST

## 2023-03-20 NOTE — PROGRESS NOTES
Physical Therapy Daily Progress Note    [address] [facility], [address]  [place] [address]      Patient: Анна Turpin   : 1960  Diagnosis/ICD-10 Code:  Acute pain of right shoulder [M25.511]  Referring practitioner: Oseas Mendenhall MD  Date of Initial Visit: Type: THERAPY  Noted: 2/15/2023  Today's Date: 3/20/2023  Patient seen for 10 sessions           Patient reports: her neck and anterior shoulder have felt better though her posterior shoulder remains sore.      Objective   See Exercise, Manual, and Modality Logs for complete treatment.       Assessment/Plan  Progressed resistance with most exercises with good tolerance. She reported less pain during R shoulder AROM following manual interventions, but remains limited, especially with reaching across her body. Palpation of R infraspinatus tendon replicated her main c/o shoulder pain and her NINOSKA would be consistent with injury to her shoulder external rotators (hyperextension and IR).            Timed:  Manual Therapy:    15     mins  38826;  Therapeutic Exercise:    17     mins  55047;     Neuromuscular Luis:   12    mins  59267;    Therapeutic Activity:     0     mins  09832;     Gait Trainin     mins  65129;     Ultrasound:     0     mins  71928;    Electrical Stimulation:    0     mins  77824 ( );    Untimed:  Electrical Stimulation:    0     mins  43128 ( );  Mechanical Traction:    0     mins  60064;     Timed Treatment:   44   mins   Total Treatment:     44   mins    Dominik Morocho PT  Physical Therapist

## 2023-04-13 ENCOUNTER — TREATMENT (OUTPATIENT)
Dept: PHYSICAL THERAPY | Facility: CLINIC | Age: 63
End: 2023-04-13
Payer: COMMERCIAL

## 2023-04-13 DIAGNOSIS — M25.511 ACUTE PAIN OF RIGHT SHOULDER: Primary | ICD-10-CM

## 2023-04-13 PROCEDURE — 97110 THERAPEUTIC EXERCISES: CPT | Performed by: PHYSICAL THERAPIST

## 2023-04-13 PROCEDURE — 97112 NEUROMUSCULAR REEDUCATION: CPT | Performed by: PHYSICAL THERAPIST

## 2023-04-13 PROCEDURE — 97530 THERAPEUTIC ACTIVITIES: CPT | Performed by: PHYSICAL THERAPIST

## 2023-04-13 NOTE — PROGRESS NOTES
Physical Therapy Daily Progress Note and Discharge Summary      [address] [facility], [address]  [place] [address]    Patient: Анна Turpin   : 1960  Diagnosis/ICD-10 Code:  Acute pain of right shoulder [M25.511]  Referring practitioner: Oseas Mendenhall MD  Date of Initial Visit: Type: THERAPY  Noted: 2/15/2023  Today's Date: 2023  Patient seen for 11 sessions      Subjective:     Subjective Questionnaire: QuickDASH: 9%    Subjective Evaluation    History of Present Illness  Mechanism of injury: CLOF: awakens from R shoulder and hip pain 1x/night due to positional intolerance on R side, no increased pain turning the steering wheel but it feels sore and tight after prolonged driving, no pain washing and drying her hair    Subjective comment: Patient reports feeling like her R shoulder strength is back but it feels generally tight and intermittently sore. Pain  Current pain ratin  At best pain ratin  At worst pain ratin         Objective          Palpation     Right   Hypertonic in the infraspinatus, levator scapulae, teres minor and upper trapezius. Tenderness of the infraspinatus, levator scapulae, teres minor and upper trapezius.     Tenderness     Right Shoulder  Tenderness in the biceps tendon (proximal).     Active Range of Motion   Left Shoulder   Flexion: 160 degrees   Extension: 75 degrees   Abduction: 170 degrees   External rotation BTH: T3   Internal rotation BTB: T5   Horizontal adduction: Left shoulder active horizontal adduction: touches R infra mid belly.     Right Shoulder   Flexion: 145 degrees   Extension: 75 degrees   Abduction: 175 degrees   External rotation BTH: T3   Internal rotation BTB: T8   Horizontal adduction: Right shoulder active horizontal adduction: touches L spine of scapula, sore and tight.     Passive Range of Motion     Right Shoulder   Abduction: Right shoulder passive abduction: 140, pain.     Strength/Myotome Testing     Left Shoulder     Planes of  Motion   Flexion: 4+   Extension: 4+   Abduction: 4+   Horizontal abduction: 4     Right Shoulder     Planes of Motion   Flexion: 5   Extension: 5   Abduction: 5   External rotation at 0°: 4+   External rotation at 90°: 4+   Internal rotation at 0°: 5   Internal rotation at 90°: 4+   Horizontal abduction: 4     Isolated Muscles   Biceps: 5   Serratus anterior: 5     Tests     Right Shoulder   Positive full can.         Goals  Short Term Goals (4 wks)  1. Patient will improve Quick Dash score to < 48 %. Met   2. Pt will demonstrate symmetrical, pain-free shoulder AROM. Met   3. Patient will be independent and compliant with initial home exercise program. Met    Long Term Goals (8 wks)  1. Patient to demonstrate normal and pain free UE strength with MMTs. Met   2. Patient will improve Quick Dash score to < 40 %. Met  3. Patient to no longer awaken from R shoulder pain. In progress  4. Pt. will be independent and compliant with advanced home exercise program to facilitate self-management of symptoms. Met     Discharge Summary  Patient demonstrates full, pain free R shoulder ROM with the exception of reaching across her body, which remains limited. Mild rotator cuff weakness remains, but should continue to resolve with HEP and she demonstrates good global R shoulder strength. Patient reporting significant improvement with BADLs, though continues to intermittently awaken from R shoulder and hip discomfort in R sidelying. She is discharging from PT to prevent exhausting her yearly visit limit from insurance. She demonstrates competence with HEP and should continue to successfully address her residual impairments.       Timed:  Manual Therapy:    0     mins  65739;  Therapeutic Exercise:    12     mins  12651;     Neuromuscular Luis:    8    mins  27844;    Therapeutic Activity:     16     mins  66565;     Gait Trainin     mins  24880;     Ultrasound:     0     mins  86262;    Electrical Stimulation:    0     mins   53696 ( );    Untimed:  Electrical Stimulation:    0     mins  30925 ( );  Mechanical Traction:    0     mins  41407;     Timed Treatment:   36   mins   Total Treatment:     36   mins      Dominik Morocho PT  Physical Therapist

## 2023-05-03 ENCOUNTER — OFFICE VISIT (OUTPATIENT)
Dept: INTERNAL MEDICINE | Facility: CLINIC | Age: 63
End: 2023-05-03
Payer: COMMERCIAL

## 2023-05-03 VITALS
TEMPERATURE: 97.8 F | BODY MASS INDEX: 28.16 KG/M2 | SYSTOLIC BLOOD PRESSURE: 130 MMHG | DIASTOLIC BLOOD PRESSURE: 86 MMHG | WEIGHT: 153 LBS | RESPIRATION RATE: 12 BRPM | HEIGHT: 62 IN | HEART RATE: 80 BPM

## 2023-05-03 DIAGNOSIS — Z13.6 SCREENING FOR CARDIOVASCULAR CONDITION: ICD-10-CM

## 2023-05-03 DIAGNOSIS — F32.9 REACTIVE DEPRESSION: ICD-10-CM

## 2023-05-03 DIAGNOSIS — Z00.00 PHYSICAL EXAM: Primary | ICD-10-CM

## 2023-05-03 DIAGNOSIS — R82.998 LEUKOCYTES IN URINE: ICD-10-CM

## 2023-05-03 LAB
BILIRUB BLD-MCNC: NEGATIVE MG/DL
CLARITY, POC: CLEAR
COLOR UR: YELLOW
EXPIRATION DATE: ABNORMAL
GLUCOSE UR STRIP-MCNC: NEGATIVE MG/DL
KETONES UR QL: NEGATIVE
LEUKOCYTE EST, POC: ABNORMAL
Lab: ABNORMAL
NITRITE UR-MCNC: NEGATIVE MG/ML
PH UR: 6.5 [PH] (ref 5–8)
PROT UR STRIP-MCNC: NEGATIVE MG/DL
RBC # UR STRIP: NEGATIVE /UL
SP GR UR: 1 (ref 1–1.03)
UROBILINOGEN UR QL: NORMAL

## 2023-05-03 PROCEDURE — 99396 PREV VISIT EST AGE 40-64: CPT | Performed by: INTERNAL MEDICINE

## 2023-05-03 PROCEDURE — 80061 LIPID PANEL: CPT | Performed by: INTERNAL MEDICINE

## 2023-05-03 PROCEDURE — 36415 COLL VENOUS BLD VENIPUNCTURE: CPT | Performed by: INTERNAL MEDICINE

## 2023-05-03 PROCEDURE — 80050 GENERAL HEALTH PANEL: CPT | Performed by: INTERNAL MEDICINE

## 2023-05-03 PROCEDURE — 87086 URINE CULTURE/COLONY COUNT: CPT | Performed by: INTERNAL MEDICINE

## 2023-05-03 PROCEDURE — 81003 URINALYSIS AUTO W/O SCOPE: CPT | Performed by: INTERNAL MEDICINE

## 2023-05-03 RX ORDER — LORATADINE PSEUDOEPHEDRINE SULFATE 10; 240 MG/1; MG/1
1 TABLET, EXTENDED RELEASE ORAL DAILY
Qty: 30 TABLET | Refills: 11 | Status: SHIPPED | OUTPATIENT
Start: 2023-05-03

## 2023-05-03 NOTE — PROGRESS NOTES
Chief Complaint   Patient presents with   • Annual Exam       History of Present Illness      The patient presents for an established patient physical examination and health maintenance visit.    The patient presents for a follow-up related to depression. She denies currently having depression symptoms. She denies suicidal ideation. Her energy level is good. She denies agorophobia. She sleeps well. She is not tearful. She states that her current depression symptoms are stable. She is not on a medication.    Medications      Current Outpatient Medications:   •  diphenhydrAMINE (BENADRYL) 25 mg, Take 1 capsule by mouth At Night As Needed., Disp: , Rfl:   •  estradiol (ESTRACE) 0.1 MG/GM vaginal cream, APPLY A PEA-SIZED AMOUNT VAGINALLY TWICE A WEEK BEFORE BED., Disp: , Rfl:   •  fluocinonide (LIDEX) 0.05 % ointment, fluocinonide 0.05 % topical ointment  APPLY TO THE AFFECTED AREA(S) BY TOPICAL ROUTE 2 TIMES PER DAY PRN, Disp: , Rfl:   •  loratadine-pseudoephedrine (Claritin-D 24 Hour)  MG per 24 hr tablet, Take 1 tablet by mouth Daily., Disp: 30 tablet, Rfl: 11  •  mupirocin (BACTROBAN) 2 % ointment, mupirocin 2 % topical ointment  Apply two times a day for infection, Disp: , Rfl:   •  cyclobenzaprine (FLEXERIL) 10 MG tablet, Take 1 tablet by mouth 3 (Three) Times a Day As Needed for Muscle Spasms. (Patient not taking: Reported on 5/3/2023), Disp: 30 tablet, Rfl: 0     Allergies    Allergies   Allergen Reactions   • Sulfa Antibiotics Anaphylaxis   • Betamethasone Rash   • Banana GI Intolerance   • Blueberry [Vaccinium Angustifolium] Other (See Comments)     PER ALLERGY TESTING   • Citrus Other (See Comments) and GI Intolerance     Ecsema   • Coconut Unknown (See Comments)     Per testing   • Dairy Aid [Tilactase] Other (See Comments)     Nasal congestion   • Eggs Or Egg-Derived Products Diarrhea   • Pineapple GI Intolerance   • Wheat Bran Other (See Comments)     Nasal congestion   • Cephalexin Rash   •  "Cephalosporins Rash       Problem List    Patient Active Problem List   Diagnosis   • Anxiety   • Oral thrush       Medications, Allergies, Problems List and Past History were reviewed and updated.    Physical Examination    /86 (BP Location: Right arm, Patient Position: Sitting, Cuff Size: Adult)   Pulse 80   Temp 97.8 °F (36.6 °C) (Infrared)   Resp 12   Ht 158.1 cm (62.25\")   Wt 69.4 kg (153 lb)   LMP  (LMP Unknown) [facility]   BMI 27.76 kg/m²     HEENT: Head- Normocephalic Atraumatic. Facies- Within normal limits. Pinnas- Normal texture and shape bilaterally. Canals- Normal bilaterally. TMs- Normal bilaterally. Nares- Patent bilaterally. Nasal Septum- is normal. There is no tenderness to palpation over the frontal or maxillary sinuses. Lids- Normal bilaterally. Conjunctiva- Clear bilaterally. Sclera- Anicteric bilaterally. Oropharynx- Moist with no lesions. Tonsils- No enlargement, erythema or exudate.    Neck: Thyroid- non enlarged, symmetric and has no nodules. No bruits are detected. ROM- Normal Range of Motion with no rigidity.    Lungs: Auscultation- Clear to auscultation bilaterally. There are no retractions, clubbing or cyanosis. The Expiratory to Inspiratory ratio is equal.    Lymph Nodes: Cervical- no enlarged lymph nodes noted. Clavicular- no enlarged supraclavicular lymph nodes noted. Axillary- no enlarged axillary lymph nodes noted. Inguinal- no enlarged inguinal lymph nodes noted.    Cardiovascular: There are no carotid bruits. Heart- Normal Rate with Regular rhythm and no murmurs. There are no gallops. There are no rubs. In the lower extremities there is no edema. The upper extremities do not have edema.    Abdomen: Soft, benign, non-tender with no masses, hernias, organomegaly or scars.    Breast: Normal contours bilaterally with no masses, discharge, skin changes or lumps. There are no scars noted.    Dermatologic: The patient has no worrisome or " suspicious skin lesions noted.    Impression and Assessment    Normal Physical Examination.    Reactive Depression.    Plan    Reactive Depression Plan: She was referred to a counselor.    Counseling was provided regarding: Adequate Aerobic Exercise, Dental Visits, Flossing Teeth, Heart Healthy Diet and Seat Belt Utilization.    The following was ordered for screening and health maintenance: CBC w Automated Diff, CMP, Lipid Profile, TSH and U/A.       Immunizations Ordered and Administered: None.    Diagnoses and all orders for this visit:    1. Physical exam (Primary)  -     CBC & Differential; Future  -     Comprehensive Metabolic Panel; Future  -     TSH; Future  -     POC Urinalysis Dipstick, Automated; Future    2. Reactive depression  -     Ambulatory Referral to Behavioral Health    3. Screening for cardiovascular condition  -     Lipid Panel; Future    Other orders  -     loratadine-pseudoephedrine (Claritin-D 24 Hour)  MG per 24 hr tablet; Take 1 tablet by mouth Daily.  Dispense: 30 tablet; Refill: 11          Return to Office    The patient was instructed to return for follow-up in 1 year. Next Visit: Physical Examination. The patient was instructed to return sooner if the condition changes, worsens, or does not resolve.

## 2023-05-04 DIAGNOSIS — D75.1 POLYCYTHEMIA: Primary | ICD-10-CM

## 2023-05-04 LAB
ALBUMIN SERPL-MCNC: 4.4 G/DL (ref 3.5–5.2)
ALBUMIN/GLOB SERPL: 1.6 G/DL
ALP SERPL-CCNC: 133 U/L (ref 39–117)
ALT SERPL W P-5'-P-CCNC: 17 U/L (ref 1–33)
ANION GAP SERPL CALCULATED.3IONS-SCNC: 12.2 MMOL/L (ref 5–15)
AST SERPL-CCNC: 22 U/L (ref 1–32)
BASOPHILS # BLD AUTO: 0.06 10*3/MM3 (ref 0–0.2)
BASOPHILS NFR BLD AUTO: 0.9 % (ref 0–1.5)
BILIRUB SERPL-MCNC: 0.5 MG/DL (ref 0–1.2)
BUN SERPL-MCNC: 12 MG/DL (ref 8–23)
BUN/CREAT SERPL: 15.6 (ref 7–25)
CALCIUM SPEC-SCNC: 10 MG/DL (ref 8.6–10.5)
CHLORIDE SERPL-SCNC: 102 MMOL/L (ref 98–107)
CHOLEST SERPL-MCNC: 207 MG/DL (ref 0–200)
CO2 SERPL-SCNC: 25.8 MMOL/L (ref 22–29)
CREAT SERPL-MCNC: 0.77 MG/DL (ref 0.57–1)
DEPRECATED RDW RBC AUTO: 42.2 FL (ref 37–54)
EGFRCR SERPLBLD CKD-EPI 2021: 87.9 ML/MIN/1.73
EOSINOPHIL # BLD AUTO: 0.22 10*3/MM3 (ref 0–0.4)
EOSINOPHIL NFR BLD AUTO: 3.2 % (ref 0.3–6.2)
ERYTHROCYTE [DISTWIDTH] IN BLOOD BY AUTOMATED COUNT: 12.4 % (ref 12.3–15.4)
GLOBULIN UR ELPH-MCNC: 2.8 GM/DL
GLUCOSE SERPL-MCNC: 90 MG/DL (ref 65–99)
HCT VFR BLD AUTO: 52.7 % (ref 34–46.6)
HDLC SERPL-MCNC: 66 MG/DL (ref 40–60)
HGB BLD-MCNC: 18.2 G/DL (ref 12–15.9)
IMM GRANULOCYTES # BLD AUTO: 0.01 10*3/MM3 (ref 0–0.05)
IMM GRANULOCYTES NFR BLD AUTO: 0.1 % (ref 0–0.5)
LDLC SERPL CALC-MCNC: 104 MG/DL (ref 0–100)
LDLC/HDLC SERPL: 1.48 {RATIO}
LYMPHOCYTES # BLD AUTO: 1.83 10*3/MM3 (ref 0.7–3.1)
LYMPHOCYTES NFR BLD AUTO: 26.4 % (ref 19.6–45.3)
MCH RBC QN AUTO: 32.2 PG (ref 26.6–33)
MCHC RBC AUTO-ENTMCNC: 34.5 G/DL (ref 31.5–35.7)
MCV RBC AUTO: 93.1 FL (ref 79–97)
MONOCYTES # BLD AUTO: 0.69 10*3/MM3 (ref 0.1–0.9)
MONOCYTES NFR BLD AUTO: 10 % (ref 5–12)
NEUTROPHILS NFR BLD AUTO: 4.12 10*3/MM3 (ref 1.7–7)
NEUTROPHILS NFR BLD AUTO: 59.4 % (ref 42.7–76)
NRBC BLD AUTO-RTO: 0 /100 WBC (ref 0–0.2)
PLATELET # BLD AUTO: 219 10*3/MM3 (ref 140–450)
PMV BLD AUTO: 10.5 FL (ref 6–12)
POTASSIUM SERPL-SCNC: 4.7 MMOL/L (ref 3.5–5.2)
PROT SERPL-MCNC: 7.2 G/DL (ref 6–8.5)
RBC # BLD AUTO: 5.66 10*6/MM3 (ref 3.77–5.28)
SODIUM SERPL-SCNC: 140 MMOL/L (ref 136–145)
TRIGL SERPL-MCNC: 218 MG/DL (ref 0–150)
TSH SERPL DL<=0.05 MIU/L-ACNC: 1.85 UIU/ML (ref 0.27–4.2)
VLDLC SERPL-MCNC: 37 MG/DL (ref 5–40)
WBC NRBC COR # BLD: 6.93 10*3/MM3 (ref 3.4–10.8)

## 2023-05-05 LAB — BACTERIA SPEC AEROBE CULT: NO GROWTH

## 2023-05-30 ENCOUNTER — HOSPITAL ENCOUNTER (OUTPATIENT)
Dept: ULTRASOUND IMAGING | Facility: HOSPITAL | Age: 63
Discharge: HOME | End: 2023-05-30
Admitting: INTERNAL MEDICINE

## 2023-05-30 DIAGNOSIS — D75.1 POLYCYTHEMIA: ICD-10-CM

## 2023-05-30 PROCEDURE — 76775 US EXAM ABDO BACK WALL LIM: CPT

## 2023-06-02 ENCOUNTER — OFFICE VISIT (OUTPATIENT)
Dept: INTERNAL MEDICINE | Facility: CLINIC | Age: 63
End: 2023-06-02

## 2023-06-02 VITALS
SYSTOLIC BLOOD PRESSURE: 126 MMHG | HEART RATE: 68 BPM | TEMPERATURE: 97.8 F | BODY MASS INDEX: 26.92 KG/M2 | RESPIRATION RATE: 16 BRPM | DIASTOLIC BLOOD PRESSURE: 84 MMHG | WEIGHT: 148.38 LBS

## 2023-06-02 DIAGNOSIS — K64.5 THROMBOSED EXTERNAL HEMORRHOID: Primary | ICD-10-CM

## 2023-06-02 DIAGNOSIS — D75.1 POLYCYTHEMIA: ICD-10-CM

## 2023-06-02 LAB
BASOPHILS # BLD AUTO: 0.04 10*3/MM3 (ref 0–0.2)
BASOPHILS NFR BLD AUTO: 0.6 % (ref 0–1.5)
DEPRECATED RDW RBC AUTO: 38.7 FL (ref 37–54)
EOSINOPHIL # BLD AUTO: 0.15 10*3/MM3 (ref 0–0.4)
EOSINOPHIL NFR BLD AUTO: 2.3 % (ref 0.3–6.2)
ERYTHROCYTE [DISTWIDTH] IN BLOOD BY AUTOMATED COUNT: 11.9 % (ref 12.3–15.4)
FERRITIN SERPL-MCNC: 270 NG/ML (ref 13–150)
HCT VFR BLD AUTO: 52.8 % (ref 34–46.6)
HGB BLD-MCNC: 17.9 G/DL (ref 12–15.9)
IMM GRANULOCYTES # BLD AUTO: 0.02 10*3/MM3 (ref 0–0.05)
IMM GRANULOCYTES NFR BLD AUTO: 0.3 % (ref 0–0.5)
IRON 24H UR-MRATE: 144 MCG/DL (ref 37–145)
IRON SATN MFR SERPL: 42 % (ref 20–50)
LYMPHOCYTES # BLD AUTO: 1.66 10*3/MM3 (ref 0.7–3.1)
LYMPHOCYTES NFR BLD AUTO: 25.9 % (ref 19.6–45.3)
MCH RBC QN AUTO: 30.5 PG (ref 26.6–33)
MCHC RBC AUTO-ENTMCNC: 33.9 G/DL (ref 31.5–35.7)
MCV RBC AUTO: 89.9 FL (ref 79–97)
MONOCYTES # BLD AUTO: 0.49 10*3/MM3 (ref 0.1–0.9)
MONOCYTES NFR BLD AUTO: 7.6 % (ref 5–12)
NEUTROPHILS NFR BLD AUTO: 4.06 10*3/MM3 (ref 1.7–7)
NEUTROPHILS NFR BLD AUTO: 63.3 % (ref 42.7–76)
NRBC BLD AUTO-RTO: 0 /100 WBC (ref 0–0.2)
PLATELET # BLD AUTO: 195 10*3/MM3 (ref 140–450)
PMV BLD AUTO: 10.6 FL (ref 6–12)
RBC # BLD AUTO: 5.87 10*6/MM3 (ref 3.77–5.28)
TIBC SERPL-MCNC: 341 MCG/DL (ref 298–536)
TRANSFERRIN SERPL-MCNC: 229 MG/DL (ref 200–360)
WBC NRBC COR # BLD: 6.42 10*3/MM3 (ref 3.4–10.8)

## 2023-06-02 PROCEDURE — 83540 ASSAY OF IRON: CPT | Performed by: INTERNAL MEDICINE

## 2023-06-02 PROCEDURE — 85025 COMPLETE CBC W/AUTO DIFF WBC: CPT | Performed by: INTERNAL MEDICINE

## 2023-06-02 PROCEDURE — 82728 ASSAY OF FERRITIN: CPT | Performed by: INTERNAL MEDICINE

## 2023-06-02 PROCEDURE — 84466 ASSAY OF TRANSFERRIN: CPT | Performed by: INTERNAL MEDICINE

## 2023-06-02 PROCEDURE — 82668 ASSAY OF ERYTHROPOIETIN: CPT | Performed by: INTERNAL MEDICINE

## 2023-06-02 RX ORDER — DIAPER,BRIEF,INFANT-TODD,DISP
1 EACH MISCELLANEOUS 3 TIMES DAILY
Qty: 30 G | Refills: 1 | Status: SHIPPED | OUTPATIENT
Start: 2023-06-02 | End: 2023-06-09

## 2023-06-02 NOTE — PROGRESS NOTES
Subjective       Анна Turpin is a 63 y.o. female.     Chief Complaint   Patient presents with   • Mass     Rectum, irritated       History obtained from the patient.    The patient states she has Irritable Bowel Syndrome.  She has changed her diet to try and lose weight.  This caused constipation, so she added some roughage to her diet.  She then had diarrhea.      Rectal Pain  This is a new problem. The current episode started yesterday. The problem has been unchanged. Pertinent negatives include no abdominal pain, arthralgias, chills, fever, joint swelling, myalgias, nausea, rash, swollen glands or vomiting. Associated symptoms comments: Noticed a lump on the rectum last night, was not there yesterday am.. Nothing aggravates the symptoms. Treatments tried: Vaseline. The treatment provided mild relief.        The following portions of the patient's history were reviewed and updated as appropriate: allergies, current medications, past family history, past medical history, past social history, past surgical history and problem list.      Review of Systems   Constitutional: Negative for chills and fever.   Gastrointestinal: Positive for constipation, diarrhea and rectal pain. Negative for abdominal pain, blood in stool, nausea and vomiting.        Denies melena   Genitourinary: Negative for dysuria, frequency, hematuria and urgency.   Musculoskeletal: Negative for arthralgias, joint swelling and myalgias.   Skin: Negative for rash.   Hematological: Negative for adenopathy.           Objective     Blood pressure 126/84, pulse 68, temperature 97.8 °F (36.6 °C), temperature source Infrared, resp. rate 16, weight 67.3 kg (148 lb 6 oz), not currently breastfeeding.    Physical Exam  Vitals and nursing note reviewed.   Constitutional:       Appearance: She is well-developed.      Comments: BMI greater than 25.   Cardiovascular:      Rate and Rhythm: Normal rate and regular rhythm.      Heart sounds: Normal heart sounds.  No murmur heard.  Pulmonary:      Effort: Pulmonary effort is normal.      Breath sounds: Normal breath sounds.   Abdominal:      General: Bowel sounds are normal. There is no distension.      Palpations: Abdomen is soft. There is no hepatomegaly, splenomegaly or mass.      Tenderness: There is no abdominal tenderness. There is no right CVA tenderness or left CVA tenderness.      Comments: There is a moderate to large sized thrombosed external hemorrhoid   Skin:     Findings: No rash.   Neurological:      Mental Status: She is alert.   Psychiatric:         Mood and Affect: Mood normal.           Assessment & Plan   Diagnoses and all orders for this visit:    1. Thrombosed external hemorrhoid (Primary)  -     hydrocortisone 1 % cream; Apply 1 application topically to the appropriate area as directed 3 (Three) Times a Day for 7 days.  Dispense: 30 g; Refill: 1   The patient has a documented Betamethasone allergy, but states she has tolerated Hydrocortisone in the past.   Recommended sitz baths (information given on AVS).     Will refer to Colorectal Surgery if no improvement.    2. Polycythemia (pending labs)  -     CBC & Differential  -     Erythropoietin  -     Ferritin  -     Iron Profile      Return if symptoms worsen or fail to improve.

## 2023-06-05 ENCOUNTER — TELEPHONE (OUTPATIENT)
Dept: INTERNAL MEDICINE | Facility: CLINIC | Age: 63
End: 2023-06-05
Payer: COMMERCIAL

## 2023-06-05 DIAGNOSIS — K64.5 THROMBOSED EXTERNAL HEMORRHOID: Primary | ICD-10-CM

## 2023-06-05 LAB — EPO SERPL-ACNC: 2.7 MIU/ML (ref 2.6–18.5)

## 2023-06-05 NOTE — TELEPHONE ENCOUNTER
Order entered for referral to colorectal surgery.  She can hold off on the hydrocortisone and the sitz bath's.  If bleeding gets worse prior to being seen, she should go to the ED.    After speaking with the patient, please ask what questions she has regarding her labs and then forward message to Dr. Mendenhall to address.

## 2023-06-05 NOTE — TELEPHONE ENCOUNTER
Patient was seen on Friday for rectum lump, states is bleeding now and she is home alone worried that she would bleed and no one can help. Also would like to discuss lab results. Requested call back.

## 2023-06-05 NOTE — TELEPHONE ENCOUNTER
Patient had blood work performed 06/02/23 and had questions about the results. She noticed some clotting and asked if the estradiol vaginal cream had anything to do with the abnormal results or related to the bleeding she is currently having for her rectal lump? A referral is being made for her to see [facility].

## 2023-06-05 NOTE — TELEPHONE ENCOUNTER
Called patient and she is agreeable to the referral to [facility].    Patient asked if she should still continue with her Sitz bath and Hydrocortisone cream and also asked if she is able to eat on a normal diet?  She did not want to make the bleeding worse. If the bleeding is to get worse before she is able to be seen by [facility] what should she do?

## 2023-06-05 NOTE — TELEPHONE ENCOUNTER
It is not likely to be related to the estrogen.  Please make sure she has an appt with me in the next few weeks.  Oseas Mnedenhall MD  16:01 EDT  06/05/23

## 2023-06-05 NOTE — TELEPHONE ENCOUNTER
"Spoke with patient, informed that Dr Mendenhall said   \"It is not likely to be related to the estrogen.  Please make sure she has an appt with me in the next few weeks.\"  Verbalized understanding, states Dr Langston got her in today, and drained the area and removed a clot and a tag.  Appointment scheduled with Dr Mendenhall for 6/20 at 1pm.  Verbalized appreciation.        "

## 2023-06-05 NOTE — TELEPHONE ENCOUNTER
Called patient and read providers message. Good verb given.   Message has been sent to Dr. Mendenhall regarding lab results with questions.

## 2023-06-11 DIAGNOSIS — D75.1 POLYCYTHEMIA: Primary | ICD-10-CM

## 2023-06-12 ENCOUNTER — TELEPHONE (OUTPATIENT)
Dept: INTERNAL MEDICINE | Facility: CLINIC | Age: 63
End: 2023-06-12
Payer: COMMERCIAL

## 2023-06-14 ENCOUNTER — TELEPHONE (OUTPATIENT)
Dept: INTERNAL MEDICINE | Facility: CLINIC | Age: 63
End: 2023-06-14
Payer: COMMERCIAL

## 2023-06-14 NOTE — TELEPHONE ENCOUNTER
Patient is wanting to know who Dr. Mendenhall would recommend her to see in hematology. She prefers a woman DrVielka But she does not have an opening until 8/24/23. Is it okay for her wait this long or does she need to be seen sooner? Please call: [phone]

## 2023-06-22 PROBLEM — D75.1 ERYTHROCYTOSIS: Chronic | Status: ACTIVE | Noted: 2023-06-22

## 2023-06-22 PROBLEM — D75.1 ERYTHROCYTOSIS: Status: ACTIVE | Noted: 2023-06-22

## 2023-07-28 ENCOUNTER — TELEPHONE (OUTPATIENT)
Dept: INTERNAL MEDICINE | Facility: CLINIC | Age: 63
End: 2023-07-28
Payer: COMMERCIAL

## 2023-08-03 ENCOUNTER — OFFICE VISIT (OUTPATIENT)
Dept: ONCOLOGY | Facility: CLINIC | Age: 63
End: 2023-08-03
Payer: COMMERCIAL

## 2023-08-03 ENCOUNTER — LAB (OUTPATIENT)
Dept: LAB | Facility: HOSPITAL | Age: 63
End: 2023-08-03
Payer: COMMERCIAL

## 2023-08-03 VITALS
DIASTOLIC BLOOD PRESSURE: 78 MMHG | OXYGEN SATURATION: 96 % | TEMPERATURE: 97.9 F | HEIGHT: 62 IN | WEIGHT: 141 LBS | SYSTOLIC BLOOD PRESSURE: 148 MMHG | BODY MASS INDEX: 25.95 KG/M2 | HEART RATE: 96 BPM | RESPIRATION RATE: 18 BRPM

## 2023-08-03 DIAGNOSIS — D75.1 ERYTHROCYTOSIS: ICD-10-CM

## 2023-08-03 DIAGNOSIS — D75.1 ERYTHROCYTOSIS: Primary | Chronic | ICD-10-CM

## 2023-08-03 LAB
BASOPHILS # BLD AUTO: 0.04 10*3/MM3 (ref 0–0.2)
BASOPHILS NFR BLD AUTO: 0.6 % (ref 0–1.5)
DEPRECATED RDW RBC AUTO: 45.3 FL (ref 37–54)
EOSINOPHIL # BLD AUTO: 0.17 10*3/MM3 (ref 0–0.4)
EOSINOPHIL NFR BLD AUTO: 2.6 % (ref 0.3–6.2)
ERYTHROCYTE [DISTWIDTH] IN BLOOD BY AUTOMATED COUNT: 13.4 % (ref 12.3–15.4)
HCT VFR BLD AUTO: 39.9 % (ref 34–46.6)
HGB BLD-MCNC: 13.3 G/DL (ref 12–15.9)
IMM GRANULOCYTES # BLD AUTO: 0.01 10*3/MM3 (ref 0–0.05)
IMM GRANULOCYTES NFR BLD AUTO: 0.2 % (ref 0–0.5)
LYMPHOCYTES # BLD AUTO: 1.9 10*3/MM3 (ref 0.7–3.1)
LYMPHOCYTES NFR BLD AUTO: 29.2 % (ref 19.6–45.3)
MCH RBC QN AUTO: 30.4 PG (ref 26.6–33)
MCHC RBC AUTO-ENTMCNC: 33.3 G/DL (ref 31.5–35.7)
MCV RBC AUTO: 91.1 FL (ref 79–97)
MONOCYTES # BLD AUTO: 0.59 10*3/MM3 (ref 0.1–0.9)
MONOCYTES NFR BLD AUTO: 9.1 % (ref 5–12)
NEUTROPHILS NFR BLD AUTO: 3.8 10*3/MM3 (ref 1.7–7)
NEUTROPHILS NFR BLD AUTO: 58.3 % (ref 42.7–76)
PLATELET # BLD AUTO: 218 10*3/MM3 (ref 140–450)
PMV BLD AUTO: 10 FL (ref 6–12)
RBC # BLD AUTO: 4.38 10*6/MM3 (ref 3.77–5.28)
WBC NRBC COR # BLD: 6.51 10*3/MM3 (ref 3.4–10.8)

## 2023-08-03 PROCEDURE — 99213 OFFICE O/P EST LOW 20 MIN: CPT | Performed by: INTERNAL MEDICINE

## 2023-08-03 PROCEDURE — 36415 COLL VENOUS BLD VENIPUNCTURE: CPT

## 2023-08-03 PROCEDURE — 85025 COMPLETE CBC W/AUTO DIFF WBC: CPT

## 2023-08-08 ENCOUNTER — TELEPHONE (OUTPATIENT)
Dept: INTERNAL MEDICINE | Facility: CLINIC | Age: 63
End: 2023-08-08
Payer: COMMERCIAL

## 2023-08-08 NOTE — TELEPHONE ENCOUNTER
Caller: Анна Turpin    Relationship: Self    Best call back number: [phone]     What is the best time to reach you: ANYTIME    Who are you requesting to speak with (clinical staff, provider,  specific staff member): DR YOUNG    What was the call regarding: HEMATOLOGY SAID THAT THE PATIENT'S LAB WORK LOOKED FINE AND THAT THEY DID NOT NEED TO SEE HER AGAIN, PATIENT WOULD LIKE TO KNOW IF THE FOLLOW UP WITH DR YOUNG IS NECESSARY?    PLEASE ADVISE

## 2023-08-09 NOTE — TELEPHONE ENCOUNTER
Called patient and left detailed vm.    HUB OKAY TO READ:   She can schedule a follow-up with me in about 4 months.

## 2023-08-09 NOTE — TELEPHONE ENCOUNTER
She can schedule a follow-up with me in about 4 months.   Oseas Mendenhall MD  07:36 EDT  08/09/23

## 2023-08-10 NOTE — TELEPHONE ENCOUNTER
2nd attempt calling patient,left detailed vm.     HUB OKAY TO READ:   She can schedule a follow-up with me in about 4 months.

## 2023-08-14 NOTE — TELEPHONE ENCOUNTER
Patient called and spoke with someone at the [facility], cancelled appointment for tomorrow and rescheduled for 1/15/24 with Dr Mendenhall.

## 2024-01-13 NOTE — TELEPHONE ENCOUNTER
Patient presented with Headache with migraine criteria   -Headache cocktail    Whichever she can get (Pfizer or Moderna).    Oseas Mendenhall MD  19:48 EST  01/17/21

## 2024-03-22 RX ORDER — FLUTICASONE PROPIONATE 50 MCG
2 SPRAY, SUSPENSION (ML) NASAL DAILY
Qty: 16 ML | Refills: 2 | Status: SHIPPED | OUTPATIENT
Start: 2024-03-22

## 2024-03-26 ENCOUNTER — OFFICE VISIT (OUTPATIENT)
Dept: INTERNAL MEDICINE | Facility: CLINIC | Age: 64
End: 2024-03-26
Payer: COMMERCIAL

## 2024-03-26 VITALS
HEART RATE: 92 BPM | BODY MASS INDEX: 26.67 KG/M2 | DIASTOLIC BLOOD PRESSURE: 76 MMHG | TEMPERATURE: 97.7 F | SYSTOLIC BLOOD PRESSURE: 128 MMHG | WEIGHT: 147 LBS | RESPIRATION RATE: 16 BRPM

## 2024-03-26 DIAGNOSIS — D75.1 POLYCYTHEMIA: Primary | ICD-10-CM

## 2024-03-26 DIAGNOSIS — Z91.09 ALLERGY TO ENVIRONMENTAL FACTORS: ICD-10-CM

## 2024-03-26 DIAGNOSIS — J30.2 SEASONAL ALLERGIC RHINITIS, UNSPECIFIED TRIGGER: ICD-10-CM

## 2024-03-26 LAB
BASOPHILS # BLD AUTO: 0.06 10*3/MM3 (ref 0–0.2)
BASOPHILS NFR BLD AUTO: 0.8 % (ref 0–1.5)
DEPRECATED RDW RBC AUTO: 39.8 FL (ref 37–54)
EOSINOPHIL # BLD AUTO: 0.18 10*3/MM3 (ref 0–0.4)
EOSINOPHIL NFR BLD AUTO: 2.5 % (ref 0.3–6.2)
ERYTHROCYTE [DISTWIDTH] IN BLOOD BY AUTOMATED COUNT: 12 % (ref 12.3–15.4)
HCT VFR BLD AUTO: 43.6 % (ref 34–46.6)
HGB BLD-MCNC: 14.8 G/DL (ref 12–15.9)
IMM GRANULOCYTES # BLD AUTO: 0.02 10*3/MM3 (ref 0–0.05)
IMM GRANULOCYTES NFR BLD AUTO: 0.3 % (ref 0–0.5)
LYMPHOCYTES # BLD AUTO: 1.76 10*3/MM3 (ref 0.7–3.1)
LYMPHOCYTES NFR BLD AUTO: 24.7 % (ref 19.6–45.3)
MCH RBC QN AUTO: 31.1 PG (ref 26.6–33)
MCHC RBC AUTO-ENTMCNC: 33.9 G/DL (ref 31.5–35.7)
MCV RBC AUTO: 91.6 FL (ref 79–97)
MONOCYTES # BLD AUTO: 0.76 10*3/MM3 (ref 0.1–0.9)
MONOCYTES NFR BLD AUTO: 10.7 % (ref 5–12)
NEUTROPHILS NFR BLD AUTO: 4.35 10*3/MM3 (ref 1.7–7)
NEUTROPHILS NFR BLD AUTO: 61 % (ref 42.7–76)
NRBC BLD AUTO-RTO: 0 /100 WBC (ref 0–0.2)
PLATELET # BLD AUTO: 215 10*3/MM3 (ref 140–450)
PMV BLD AUTO: 10.5 FL (ref 6–12)
RBC # BLD AUTO: 4.76 10*6/MM3 (ref 3.77–5.28)
WBC NRBC COR # BLD AUTO: 7.13 10*3/MM3 (ref 3.4–10.8)

## 2024-03-26 PROCEDURE — 85025 COMPLETE CBC W/AUTO DIFF WBC: CPT | Performed by: INTERNAL MEDICINE

## 2024-03-26 RX ORDER — DEXAMETHASONE SODIUM PHOSPHATE 4 MG/ML
12 INJECTION, SOLUTION INTRA-ARTICULAR; INTRALESIONAL; INTRAMUSCULAR; INTRAVENOUS; SOFT TISSUE ONCE
Status: COMPLETED | OUTPATIENT
Start: 2024-03-26 | End: 2024-03-26

## 2024-03-26 RX ORDER — PSEUDOEPHEDRINE HCL, GUAIFENESIN 375; 60 MG/1; MG/1
1 TABLET ORAL 2 TIMES DAILY
Qty: 60 TABLET | Refills: 2 | Status: SHIPPED | OUTPATIENT
Start: 2024-03-26

## 2024-03-26 RX ADMIN — DEXAMETHASONE SODIUM PHOSPHATE 12 MG: 4 INJECTION, SOLUTION INTRA-ARTICULAR; INTRALESIONAL; INTRAMUSCULAR; INTRAVENOUS; SOFT TISSUE at 16:40

## 2024-03-26 NOTE — PROGRESS NOTES
Chief Complaint   Patient presents with    Follow-up     Follow up chronic medical problems       History of Present Illness      The patient presents for a follow-up related to polycythemia. She denies epistaxis, hematuria, rectal bleeding, bruising, a petechial rash or hemarthrosis. She denies excessive menstrual bleeding. She denies the possibility of pregnancy. She does not report a cough, a sore throat, night sweats, abdominal pain, neck stiffness, dysuria, bone pain, joint pain or myalgias. The patient has no vertigo, visual changes, tinnitus, recurrent thrombosis, erythromelalgia, pruritis, hemoptysis or dyspnea.    The patient presents for an acute visit for a one month history of allergy symptoms. The symptoms are seasonal. Her allergies are worse in the spring. The patient reports facial pain, nasal congestion, a runny nose, itchy watery eyes and sneezing but the patient has not noted a dry cough, a wet cough, wheezing, fever, a headache, eye drainage, ear pain, ear drainage, a rash, nausea, vomiting, diarrhea, chills, decreased appetite or eczema. She has used steroid nasal sprays and Claritin for the symptoms.  The antihistamines did result in improvement of her allergies.    Medications      Current Outpatient Medications:     cyclobenzaprine (FLEXERIL) 10 MG tablet, Take 1 tablet by mouth 3 (Three) Times a Day As Needed for Muscle Spasms., Disp: 30 tablet, Rfl: 0    diphenhydrAMINE (BENADRYL) 25 mg, Take 1 capsule by mouth At Night As Needed., Disp: , Rfl:     estradiol (ESTRACE) 0.1 MG/GM vaginal cream, APPLY A PEA-SIZED AMOUNT VAGINALLY TWICE A WEEK BEFORE BED., Disp: , Rfl:     fluocinonide (LIDEX) 0.05 % ointment, fluocinonide 0.05 % topical ointment  APPLY TO THE AFFECTED AREA(S) BY TOPICAL ROUTE 2 TIMES PER DAY PRN, Disp: , Rfl:     fluticasone (FLONASE) 50 MCG/ACT nasal spray, SPRAY 2 SPRAYS INTO THE NOSTRIL AS DIRECTED BY PROVIDER DAILY., Disp: 16 mL, Rfl: 2       Allergies    Allergies    Allergen Reactions    Sulfa Antibiotics Anaphylaxis    Betamethasone Rash    Banana GI Intolerance    Blueberry [Vaccinium Angustifolium] Other (See Comments)     PER ALLERGY TESTING    Citrus Other (See Comments) and GI Intolerance     Ecsema    Coconut Unknown (See Comments)     Per testing    Dairy Aid [Tilactase] Other (See Comments)     Nasal congestion    Egg-Derived Products Diarrhea    Pineapple GI Intolerance    Wheat Other (See Comments)     Nasal congestion    Cephalexin Rash    Cephalosporins Rash       Problem List    Patient Active Problem List   Diagnosis    Anxiety    Oral thrush    Erythrocytosis       Medications, Allergies, Problems List and Past History were reviewed and updated.    Physical Examination    /76 (BP Location: Left arm, Patient Position: Sitting, Cuff Size: Adult)   Pulse 92   Temp 97.7 °F (36.5 °C) (Infrared)   Resp 16   Wt 66.7 kg (147 lb)   LMP  (LMP Unknown) Comment: [facility]   BMI 26.67 kg/m²       HEENT: Head- Normocephalic Atraumatic. Facies- Within normal limits. Pinnas- Normal texture and shape bilaterally. Canals- Normal bilaterally. TMs- Normal bilaterally. Nares- Patent bilaterally. Nasal Septum- is normal. There is no tenderness to palpation over the frontal or maxillary sinuses. Lids- Normal bilaterally. Conjunctiva- Clear bilaterally. Sclera- Anicteric bilaterally. Oropharynx- Moist with no lesions. Tonsils- No enlargement, erythema or exudate.    Neck: Thyroid- non enlarged, symmetric and has no nodules. No bruits are detected. ROM- Normal Range of Motion with no rigidity.    Lungs: Auscultation- Clear to auscultation bilaterally. There are no retractions, clubbing or cyanosis. The Expiratory to Inspiratory ratio is equal.    Lymph Nodes: Cervical- no enlarged lymph nodes noted. Clavicular- Deferred. Axillary- Deferred. Inguinal- Deferred.    Cardiovascular: There are no carotid bruits. Heart- Normal Rate with Regular rhythm  and no murmurs. There are no gallops. There are no rubs. In the lower extremities there is no edema. The upper extremities do not have edema.    Abdomen: Soft, benign, non-tender with no masses, hernias, organomegaly or scars.    Impression and Assessment    Polycythemia.    Allergic Rhinitis.    Plan    Allergic Rhinitis Plan: A medication adjustment will be made as noted below. Medication will be added as noted below.    Polycythemia Plan: Further plans will be made after results of tests are available.    Diagnoses and all orders for this visit:    1. Polycythemia (Primary)  -     CBC & Differential; Future    2. Seasonal allergic rhinitis, unspecified trigger  -     dexAMETHasone (DECADRON) injection 12 mg  -     Pseudoephedrine-guaiFENesin  MG tablet; Take 1 tablet by mouth 2 (Two) Times a Day.  Dispense: 60 tablet; Refill: 2        Return to Office    The patient was instructed to return for follow-up in 2 months. The patient was instructed to return sooner if the condition changes, worsens, or does not resolve.

## 2024-03-28 ENCOUNTER — TELEPHONE (OUTPATIENT)
Dept: INTERNAL MEDICINE | Facility: CLINIC | Age: 64
End: 2024-03-28
Payer: COMMERCIAL

## 2024-03-28 NOTE — TELEPHONE ENCOUNTER
Pharmacy sent notification that Pseudoephedrine-guaiFENesin  MG tablet is on back order and unavailable

## 2024-05-15 ENCOUNTER — TELEPHONE (OUTPATIENT)
Dept: INTERNAL MEDICINE | Facility: CLINIC | Age: 64
End: 2024-05-15
Payer: COMMERCIAL

## 2024-05-15 NOTE — TELEPHONE ENCOUNTER
Caller: Анна Turpin    Relationship: Self    Best call back number: [phone]    What is the medical concern/diagnosis: LUMP IN LEFT BREAST     What specialty or service is being requested: MAMMOGRAM     What is the provider, practice or medical service name:  [facility]     What is the office phone number: [phone]    Any additional details: PATIENT IS ASKING FOR DIAGNOSTIC MAMMOGRAM FOR A KNOT IN LEFT BREAST ABOVE NIPPLE

## 2024-05-20 ENCOUNTER — OFFICE VISIT (OUTPATIENT)
Dept: INTERNAL MEDICINE | Facility: CLINIC | Age: 64
End: 2024-05-20
Payer: COMMERCIAL

## 2024-05-20 VITALS
DIASTOLIC BLOOD PRESSURE: 76 MMHG | RESPIRATION RATE: 18 BRPM | HEART RATE: 100 BPM | TEMPERATURE: 98.4 F | SYSTOLIC BLOOD PRESSURE: 126 MMHG | BODY MASS INDEX: 27.28 KG/M2 | WEIGHT: 150.38 LBS

## 2024-05-20 DIAGNOSIS — N63.0 BREAST MASS IN FEMALE: Primary | ICD-10-CM

## 2024-05-20 PROCEDURE — 99213 OFFICE O/P EST LOW 20 MIN: CPT | Performed by: INTERNAL MEDICINE

## 2024-05-20 NOTE — PROGRESS NOTES
Chief Complaint   Patient presents with    Breast Mass     Left breast       History of Present Illness      The patient presents for an acute visit for a left sided breast mass. This has been present for a two week duration. The area of concern in the left breast is located in the upper outer quadrant. The patient denies nipple discharge. The patient denies the possibility of pregnancy. The patient reports excessive caffeine intake. There is no history of excessive alcohol intake or tobacco abuse.    Medications      Current Outpatient Medications:     cyclobenzaprine (FLEXERIL) 10 MG tablet, Take 1 tablet by mouth 3 (Three) Times a Day As Needed for Muscle Spasms., Disp: 30 tablet, Rfl: 0    diphenhydrAMINE (BENADRYL) 25 mg, Take 1 capsule by mouth At Night As Needed., Disp: , Rfl:     estradiol (ESTRACE) 0.1 MG/GM vaginal cream, APPLY A PEA-SIZED AMOUNT VAGINALLY TWICE A WEEK BEFORE BED., Disp: , Rfl:     fluticasone (FLONASE) 50 MCG/ACT nasal spray, SPRAY 2 SPRAYS INTO THE NOSTRIL AS DIRECTED BY PROVIDER DAILY., Disp: 16 mL, Rfl: 2     Allergies    Allergies   Allergen Reactions    Sulfa Antibiotics Anaphylaxis    Betamethasone Rash    Banana GI Intolerance    Blueberry [Vaccinium Angustifolium] Other (See Comments)     PER ALLERGY TESTING    Citrus Other (See Comments) and GI Intolerance     Ecsema    Coconut Unknown (See Comments)     Per testing    Dairy Aid [Tilactase] Other (See Comments)     Nasal congestion    Egg-Derived Products Diarrhea    Pineapple GI Intolerance    Wheat Other (See Comments)     Nasal congestion    Cephalexin Rash    Cephalosporins Rash       Problem List    Patient Active Problem List   Diagnosis    Anxiety    Oral thrush    Erythrocytosis       Medications, Allergies, Problems List and Past History were reviewed and updated.    Physical Examination    /76 (BP Location: Left arm, Patient Position: Sitting, Cuff Size: Adult)   Pulse 100   Temp 98.4 °F (36.9 °C) (Temporal)    Resp 18   Wt 68.2 kg (150 lb 6 oz)   LMP  (LMP Unknown) Comment: [facility]   BMI 27.28 kg/m²       Lymph Nodes: Cervical- no enlarged lymph nodes noted. Clavicular- Deferred. Axillary- no enlarged axillary lymph nodes noted. Inguinal- Deferred.    Breast: Examination reveals that the right breast has normal contours with no masses, discharge, skin changes, or lumps and the left breast has a mass in the upper outer quadrant. There are no scars noted. There is a 2x2 cm mass noted at 2 o'clock adjacent to the areola.    Impression and Assessment    Breast Mass.    Plan    Breast Mass Plan: Further plans will be made after the test results are reviewed.    Diagnoses and all orders for this visit:    1. Breast mass in female (Primary)  -     Mammo Diagnostic Digital Tomosynthesis Bilateral With CAD; Future          Return to Office    The patient was instructed to return for follow-up at the next scheduled visit. The patient was instructed to return sooner if the condition changes, worsens, or does not resolve.

## 2024-05-24 ENCOUNTER — TELEPHONE (OUTPATIENT)
Dept: INTERNAL MEDICINE | Facility: CLINIC | Age: 64
End: 2024-05-24
Payer: COMMERCIAL

## 2024-05-24 ENCOUNTER — DOCUMENTATION (OUTPATIENT)
Dept: INTERNAL MEDICINE | Facility: CLINIC | Age: 64
End: 2024-05-24
Payer: COMMERCIAL

## 2024-05-24 DIAGNOSIS — N63.0 BREAST MASS IN FEMALE: Primary | ICD-10-CM

## 2024-05-24 NOTE — TELEPHONE ENCOUNTER
Order was fixed by Conchita Ramon [facility]. I notified patient this has been taken care of and to call office back with any other issues. Verbal understanding received from patient.

## 2024-05-24 NOTE — TELEPHONE ENCOUNTER
Pt has not received a call from referrals for lump in breast .  Pt was seen Monday is very upset that she has not received a call from referrals. Pt disconnected before end of call    Анна # [phone]

## 2024-05-24 NOTE — TELEPHONE ENCOUNTER
Pt has called again very upset that she has not received a call back does not want number to referrals wants to speak to someone.   Coworker has put her thru to office mgr

## 2024-06-14 DIAGNOSIS — N63.0 BREAST MASS IN FEMALE: ICD-10-CM

## 2024-08-27 RX ORDER — FLUTICASONE PROPIONATE 50 MCG
2 SPRAY, SUSPENSION (ML) NASAL DAILY
Qty: 48 ML | Refills: 1 | Status: SHIPPED | OUTPATIENT
Start: 2024-08-27

## 2024-12-09 ENCOUNTER — TELEPHONE (OUTPATIENT)
Dept: INTERNAL MEDICINE | Facility: CLINIC | Age: 64
End: 2024-12-09
Payer: COMMERCIAL

## 2024-12-09 DIAGNOSIS — N63.0 BREAST MASS IN FEMALE: Primary | ICD-10-CM

## 2024-12-09 DIAGNOSIS — R92.8 FOLLOW-UP EXAMINATION OF ABNORMAL MAMMOGRAM: ICD-10-CM

## 2024-12-09 NOTE — TELEPHONE ENCOUNTER
Anabelle with  [facility] called. Patient needs an order for left breast Ultra Sound.  Fax order to: [phone]  Call: [phone]

## 2024-12-10 NOTE — TELEPHONE ENCOUNTER
Anabelle called checking on order for Mammogram   6 month follow up for Abnormal Mammogram   She has an appointment on 12/16/2024 and need an order in order to keep appointment.

## 2024-12-11 NOTE — TELEPHONE ENCOUNTER
I tried to reach Anabelle at  to make sure she got faxed order but I was on hold for a while and had to go, I assume she will call back if she does not get it

## 2024-12-13 ENCOUNTER — TELEPHONE (OUTPATIENT)
Dept: INTERNAL MEDICINE | Facility: CLINIC | Age: 64
End: 2024-12-13
Payer: COMMERCIAL

## 2024-12-13 NOTE — TELEPHONE ENCOUNTER
Caller: Анна Turpin    Relationship: Self    Best call back number: [phone]    What medication are you requesting: SOMETHING TO TREAT HER SYMPTOMS     What are your current symptoms: DIARRHEA VOMITING HEADACHE FEVER AND CHILLS     How long have you been experiencing symptoms: SINCE LAST NIGHT     Have you had these symptoms before:    [] Yes  [x] No    Have you been treated for these symptoms before:   [] Yes  [x] No    If a prescription is needed, what is your preferred pharmacy and phone number: [id number] - [place], [place] - [address]  - [phone]  - [phone] FX     Additional notes: THE PATIENT STATES THAT SHE IS NOT ABLE TO GET IN A CAR AND COME TO THE OFFICE THE PATIENT WOULD LIKE TO KNOW IF THE DOCTOR CAN CALL IN MEDICATION FOR HER

## 2024-12-16 NOTE — TELEPHONE ENCOUNTER
Tried to reach patient no answer left voicemail to return call    RELAY:    Is the patient able to go to urgent care so she can be evaluated?  Thanks.

## 2024-12-17 NOTE — TELEPHONE ENCOUNTER
Spoke with patient, states she is feeling better and tolerating fluids well at this point.  States no fever today.  Apologized for someone not reaching her back on Friday and encouraged her to call if doesn't continue to improve or id should worsen.  Verbalized appreciation and agreement.

## 2025-01-09 ENCOUNTER — TELEPHONE (OUTPATIENT)
Dept: INTERNAL MEDICINE | Facility: CLINIC | Age: 65
End: 2025-01-09
Payer: COMMERCIAL

## 2025-01-09 DIAGNOSIS — Z12.12 ENCOUNTER FOR COLORECTAL CANCER SCREENING: Primary | ICD-10-CM

## 2025-01-09 DIAGNOSIS — Z12.11 ENCOUNTER FOR COLORECTAL CANCER SCREENING: Primary | ICD-10-CM

## 2025-01-09 NOTE — TELEPHONE ENCOUNTER
Caller: Анна Turpin    Relationship: Self    Best call back number: [phone]    What is the medical concern/diagnosis:     What specialty or service is being requested: COLONOSCOPY    What is the provider, practice or medical service name:     What is the office location:     What is the office phone number:     Any additional details: PATIENT STATES THAT IT'S BEEN 5 YEARS

## 2025-06-30 RX ORDER — FLUTICASONE PROPIONATE 50 MCG
2 SPRAY, SUSPENSION (ML) NASAL DAILY
Qty: 48 G | Refills: 1 | OUTPATIENT
Start: 2025-06-30

## 2025-06-30 NOTE — TELEPHONE ENCOUNTER
Last office visit (LOV) for chronic condition 3/26/2024  Next office visit (NOV)     HUBB please relay Return in about 2 months (around 5/26/2024) for Annual physical.   Spoke with Анна  she states she will call back and schedule appt     She did not need this Rx